# Patient Record
Sex: FEMALE | Race: WHITE | Employment: FULL TIME | URBAN - NONMETROPOLITAN AREA
[De-identification: names, ages, dates, MRNs, and addresses within clinical notes are randomized per-mention and may not be internally consistent; named-entity substitution may affect disease eponyms.]

---

## 2022-05-10 PROBLEM — G43.009 MIGRAINE WITHOUT AURA AND WITHOUT STATUS MIGRAINOSUS, NOT INTRACTABLE: Status: ACTIVE | Noted: 2022-05-10

## 2024-10-31 ENCOUNTER — INITIAL PRENATAL (OUTPATIENT)
Dept: OBGYN | Age: 24
End: 2024-10-31

## 2024-10-31 ENCOUNTER — ANCILLARY PROCEDURE (OUTPATIENT)
Dept: OBGYN | Age: 24
End: 2024-10-31
Payer: COMMERCIAL

## 2024-10-31 ENCOUNTER — HOSPITAL ENCOUNTER (OUTPATIENT)
Age: 24
Setting detail: SPECIMEN
Discharge: HOME | End: 2024-10-31
Payer: COMMERCIAL

## 2024-10-31 VITALS — SYSTOLIC BLOOD PRESSURE: 106 MMHG | WEIGHT: 129 LBS | BODY MASS INDEX: 22.14 KG/M2 | DIASTOLIC BLOOD PRESSURE: 68 MMHG

## 2024-10-31 DIAGNOSIS — Z32.01 POSITIVE PREGNANCY TEST: ICD-10-CM

## 2024-10-31 DIAGNOSIS — Z3A.08 8 WEEKS GESTATION OF PREGNANCY: ICD-10-CM

## 2024-10-31 DIAGNOSIS — Z34.01 ENCOUNTER FOR SUPERVISION OF NORMAL FIRST PREGNANCY IN FIRST TRIMESTER: Primary | ICD-10-CM

## 2024-10-31 DIAGNOSIS — Z34.01 ENCOUNTER FOR SUPERVISION OF NORMAL FIRST PREGNANCY IN FIRST TRIMESTER: ICD-10-CM

## 2024-10-31 DIAGNOSIS — N91.2 AMENORRHEA: ICD-10-CM

## 2024-10-31 DIAGNOSIS — O36.80X0 ENCOUNTER TO DETERMINE FETAL VIABILITY OF PREGNANCY, SINGLE OR UNSPECIFIED FETUS: ICD-10-CM

## 2024-10-31 LAB
ABO + RH BLD: NORMAL
BLOOD GROUP ANTIBODIES SERPL: NEGATIVE
HCV AB SERPL QL IA: NONREACTIVE

## 2024-10-31 PROCEDURE — 86762 RUBELLA ANTIBODY: CPT

## 2024-10-31 PROCEDURE — 86901 BLOOD TYPING SEROLOGIC RH(D): CPT

## 2024-10-31 PROCEDURE — 85025 COMPLETE CBC W/AUTO DIFF WBC: CPT

## 2024-10-31 PROCEDURE — 87591 N.GONORRHOEAE DNA AMP PROB: CPT

## 2024-10-31 PROCEDURE — 87086 URINE CULTURE/COLONY COUNT: CPT

## 2024-10-31 PROCEDURE — 86850 RBC ANTIBODY SCREEN: CPT

## 2024-10-31 PROCEDURE — 86803 HEPATITIS C AB TEST: CPT

## 2024-10-31 PROCEDURE — 86780 TREPONEMA PALLIDUM: CPT

## 2024-10-31 PROCEDURE — 87389 HIV-1 AG W/HIV-1&-2 AB AG IA: CPT

## 2024-10-31 PROCEDURE — 86900 BLOOD TYPING SEROLOGIC ABO: CPT

## 2024-10-31 PROCEDURE — 87340 HEPATITIS B SURFACE AG IA: CPT

## 2024-10-31 PROCEDURE — 87491 CHLMYD TRACH DNA AMP PROBE: CPT

## 2024-10-31 RX ORDER — ONDANSETRON 4 MG/1
4 TABLET, FILM COATED ORAL EVERY 8 HOURS PRN
COMMUNITY

## 2024-10-31 RX ORDER — FOLIC ACID 0.8 MG
800 TABLET ORAL DAILY
COMMUNITY

## 2024-10-31 RX ORDER — CLINDAMYCIN PHOSPHATE 10 MG/G
GEL TOPICAL 2 TIMES DAILY
COMMUNITY

## 2024-10-31 NOTE — PROGRESS NOTES
New OB Visit    Date of service: 10/31/2024    Fred Brown  Is a 23 y.o.  female presenting for a New OB visit with Nurse. Name of Father of Baby is Cresencio Brown  and is involved. 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    Pt is not Fertility pt.    PT's PCP is: Lorena Paredes, APRN - CNP     : 2000                                             Subjective:        No LMP recorded.   OB History   No obstetric history on file.           Social History     Tobacco Use   Smoking Status Never   Smokeless Tobacco Never        Social History     Substance and Sexual Activity   Alcohol Use None        Allergies: Patient has no known allergies.    Past Medical History:   Diagnosis Date    Migraines        No past surgical history on file.      Current Outpatient Medications:     pimecrolimus (ELIDEL) 1 % cream, Apply topically 2 times daily., Disp: 1 each, Rfl: 1    propranolol (INDERAL) 10 MG tablet, Take 1 tablet by mouth 2 times daily, Disp: 60 tablet, Rfl: 3    SUMAtriptan (IMITREX) 50 MG tablet, Take 1 tablet by mouth once as needed for Migraine May repeat x 1 in 2 hours PRN, Disp: 9 tablet, Rfl: 1    Calcium Carbonate (CALCIUM 500 PO), Take 1 tablet by mouth daily (Patient not taking: Reported on 3/16/2023), Disp: , Rfl:       Vital Signs not currently breastfeeding.      No results found for this visit on 10/31/24.      Pain: none                            Nausea: yes      Vomiting: yes        Breast enlargement or tenderness: yes    Frequency of urination:yes      Fatigue: yes         Patient history reviewed. Educational materials given and genetic testing reviewed.     OB Genetic Screening    Patient's Age 35+ at Date of Delivery      Cystic Fibrosis      Thalassemia MCV<80      [place] Chorea      Neural Tube Defect      Mental Retardation/Autism      Congenital Heart Defect      Was Person Treated for Fragilex?      Down Syndrome      Other Inherited Genetic Chromosomal Disorder?

## 2024-10-31 NOTE — PATIENT INSTRUCTIONS
pregnancy, then you'll probably be advised to gain 28 to 40 pounds. Your doctor will work with you to set a weight goal that is right for you. Gaining a healthy amount of weight helps you have a healthy baby.  Follow-up care is a key part of your treatment and safety. Be sure to make and go to all appointments, and call your doctor if you are having problems. It's also a good idea to know your test results and keep a list of the medicines you take.  How can you care for yourself at home?  Eat plenty of fruits and vegetables. Include a variety of orange, yellow, and leafy dark-green vegetables every day.  Choose whole-grain bread, cereal, and pasta. Good choices include whole wheat bread, whole wheat pasta, brown rice, and oatmeal.  Get 4 or more servings of milk and milk products each day. Good choices include nonfat or low-fat milk, yogurt, and cheese. If you cannot eat milk products, you can get calcium from calcium-fortified products such as orange juice, soy milk, and tofu. Other non-milk sources of calcium include leafy green vegetables, such as broccoli, kale, mustard greens, turnip greens, bok jake, and brussels sprouts.  If you eat meat, pick lower-fat types. Good choices include lean cuts of meat and chicken or turkey without the skin.  Avoid fish that are high in mercury. These include shark, swordfish, radha mackerel, marlin, orange roughy, and bigeye tuna, as well as tilefish from the [facility].  It's okay to eat up to 8 to 12 ounces a week of fish that are low in mercury or up to 4 ounces a week of fish that have medium levels of mercury. Some fish that are low in mercury are salmon, shrimp, canned light tuna, cod, and tilapia. Some fish that have medium levels of mercury are halibut and white albacore tuna.  For more advice about eating fish, you can visit the U.S. Food and Drug Administration (FDA) or U.S. Environmental Protection Agency (EPA) website.  Heat lunch meats (such as turkey, ham, or

## 2024-11-01 LAB
BASOPHILS # BLD: 0.04 K/UL (ref 0–0.2)
BASOPHILS NFR BLD: 0 % (ref 0–2)
CHLAMYDIA DNA UR QL NAA+PROBE: NEGATIVE
EOSINOPHIL # BLD: 0.04 K/UL (ref 0–0.44)
EOSINOPHILS RELATIVE PERCENT: 0 % (ref 1–4)
ERYTHROCYTE [DISTWIDTH] IN BLOOD BY AUTOMATED COUNT: 11.9 % (ref 11.8–14.4)
HBV SURFACE AG SERPL QL IA: NONREACTIVE
HCT VFR BLD AUTO: 36.1 % (ref 36.3–47.1)
HGB BLD-MCNC: 12.4 G/DL (ref 11.9–15.1)
HIV 1+2 AB+HIV1 P24 AG SERPL QL IA: NONREACTIVE
IMM GRANULOCYTES # BLD AUTO: 0.03 K/UL (ref 0–0.3)
IMM GRANULOCYTES NFR BLD: 0 %
LYMPHOCYTES NFR BLD: 2.35 K/UL (ref 1.1–3.7)
LYMPHOCYTES RELATIVE PERCENT: 22 % (ref 24–43)
MCH RBC QN AUTO: 30.5 PG (ref 25.2–33.5)
MCHC RBC AUTO-ENTMCNC: 34.3 G/DL (ref 28.4–34.8)
MCV RBC AUTO: 88.7 FL (ref 82.6–102.9)
MICROORGANISM SPEC CULT: NORMAL
MONOCYTES NFR BLD: 0.86 K/UL (ref 0.1–1.2)
MONOCYTES NFR BLD: 8 % (ref 3–12)
N GONORRHOEA DNA UR QL NAA+PROBE: NEGATIVE
NEUTROPHILS NFR BLD: 70 % (ref 36–65)
NEUTS SEG NFR BLD: 7.38 K/UL (ref 1.5–8.1)
NRBC BLD-RTO: 0 PER 100 WBC
PLATELET # BLD AUTO: 339 K/UL (ref 138–453)
PMV BLD AUTO: 10.5 FL (ref 8.1–13.5)
RBC # BLD AUTO: 4.07 M/UL (ref 3.95–5.11)
RUBV IGG SERPL QL IA: 36.4 IU/ML
SERVICE CMNT-IMP: NORMAL
SPECIMEN DESCRIPTION: NORMAL
SPECIMEN DESCRIPTION: NORMAL
T PALLIDUM AB SER QL IA: NONREACTIVE
WBC OTHER # BLD: 10.7 K/UL (ref 3.5–11.3)

## 2024-11-29 SDOH — ECONOMIC STABILITY: TRANSPORTATION INSECURITY
IN THE PAST 12 MONTHS, HAS LACK OF TRANSPORTATION KEPT YOU FROM MEETINGS, WORK, OR FROM GETTING THINGS NEEDED FOR DAILY LIVING?: NO

## 2024-11-29 SDOH — ECONOMIC STABILITY: FOOD INSECURITY: WITHIN THE PAST 12 MONTHS, YOU WORRIED THAT YOUR FOOD WOULD RUN OUT BEFORE YOU GOT MONEY TO BUY MORE.: NEVER TRUE

## 2024-11-29 SDOH — ECONOMIC STABILITY: FOOD INSECURITY: WITHIN THE PAST 12 MONTHS, THE FOOD YOU BOUGHT JUST DIDN'T LAST AND YOU DIDN'T HAVE MONEY TO GET MORE.: NEVER TRUE

## 2024-11-29 SDOH — ECONOMIC STABILITY: INCOME INSECURITY: HOW HARD IS IT FOR YOU TO PAY FOR THE VERY BASICS LIKE FOOD, HOUSING, MEDICAL CARE, AND HEATING?: NOT VERY HARD

## 2024-12-02 ENCOUNTER — ROUTINE PRENATAL (OUTPATIENT)
Dept: OBGYN | Age: 24
End: 2024-12-02

## 2024-12-02 ENCOUNTER — HOSPITAL ENCOUNTER (OUTPATIENT)
Age: 24
Setting detail: SPECIMEN
Discharge: HOME | End: 2024-12-02
Payer: COMMERCIAL

## 2024-12-02 VITALS
BODY MASS INDEX: 21.11 KG/M2 | WEIGHT: 123 LBS | HEART RATE: 102 BPM | DIASTOLIC BLOOD PRESSURE: 70 MMHG | SYSTOLIC BLOOD PRESSURE: 114 MMHG

## 2024-12-02 DIAGNOSIS — Z3A.12 12 WEEKS GESTATION OF PREGNANCY: ICD-10-CM

## 2024-12-02 DIAGNOSIS — Z34.01 ENCOUNTER FOR SUPERVISION OF NORMAL FIRST PREGNANCY IN FIRST TRIMESTER: Primary | ICD-10-CM

## 2024-12-02 DIAGNOSIS — Z12.4 SCREENING FOR CERVICAL CANCER: ICD-10-CM

## 2024-12-02 PROCEDURE — G0145 SCR C/V CYTO,THINLAYER,RESCR: HCPCS

## 2024-12-02 PROCEDURE — 0501F PRENATAL FLOW SHEET: CPT | Performed by: ADVANCED PRACTICE MIDWIFE

## 2024-12-02 NOTE — PROGRESS NOTES
Fred Brown is here at 12w4d for:    Chief Complaint   Patient presents with    Routine Prenatal Visit     TBE, patient has never had pap.        Estimated Due Date: Estimated Date of Delivery: 25    OB History    Para Term  AB Living   1             SAB IAB Ectopic Molar Multiple Live Births                    # Outcome Date GA Lbr Jerod/2nd Weight Sex Type Anes PTL Lv   1 Current                 Past Medical History:   Diagnosis Date    Migraine     Migraines        Past Surgical History:   Procedure Laterality Date    TONSILLECTOMY      WISDOM TOOTH EXTRACTION         Social History     Tobacco Use   Smoking Status Never   Smokeless Tobacco Never        Social History     Substance and Sexual Activity   Alcohol Use Not Currently               HPI: here for initial ob exam, denies c/o     PT denies fever, chills, nausea and vomiting       Vitals:  Estimated body mass index is 21.11 kg/m² as calculated from the following:    Height as of 22: 1.626 m (5' 4\").    Weight as of this encounter: 55.8 kg (123 lb).  BP: 114/70  Weight - Scale: 55.8 kg (123 lb)  Pulse: (!) 102  Patient Position: Sitting  Albumin: Negative  Glucose: Negative  Fundal Height (cm): 12 cm  Fetal HR: 155  Movement: Absent           Abdomen: flat.soft, nontender  Total body exam completed please see prenatal physical exam tab in visit navigator       Results reviewed today:    No results found for this visit on 24.        ASSESSMENT & Plan    Diagnosis Orders   1. Encounter for supervision of normal first pregnancy in first trimester        2. 12 weeks gestation of pregnancy        3. Screening for cervical cancer  PAP SMEAR                I am having Fred Brown maintain her Calcium Carbonate (CALCIUM 500 PO), SUMAtriptan, propranolol, pimecrolimus, Prenatal Vit-Fe Fumarate-FA (PRENATAL 19 PO), folic acid, ondansetron, and clindamycin.    Return in about 4 weeks (around 2024) for ob.    There are

## 2024-12-06 LAB — CYTOLOGY REPORT: NORMAL

## 2024-12-07 LAB
Lab: NORMAL
NTRA FETAL FRACTION: NORMAL
NTRA GENDER OF FETUS: NORMAL
NTRA MONOSOMY X AGE-BASED RISK TEXT: NORMAL
NTRA MONOSOMY X RESULT TEXT: NORMAL
NTRA MONOSOMY X RISK SCORE TEXT: NORMAL
NTRA TRIPLOIDY RESULT TEXT: NORMAL
NTRA TRISOMY 13 AGE-BASED RISK TEXT: NORMAL
NTRA TRISOMY 13 RESULT TEXT: NORMAL
NTRA TRISOMY 13 RISK SCORE TEXT: NORMAL
NTRA TRISOMY 18 AGE-BASED RISK TEXT: NORMAL
NTRA TRISOMY 18 RESULT TEXT: NORMAL
NTRA TRISOMY 18 RISK SCORE TEXT: NORMAL
NTRA TRISOMY 21 AGE-BASED RISK TEXT: NORMAL
NTRA TRISOMY 21 RESULT TEXT: NORMAL
NTRA TRISOMY 21 RISK SCORE TEXT: NORMAL

## 2024-12-11 LAB
Lab: NEGATIVE
Lab: NORMAL
NTRA CYSTIC FIBROSIS: NEGATIVE
NTRA FRAGILE X SYNDROME: NEGATIVE
NTRA SPINAL MUSCULAR ATROPHY: NEGATIVE

## 2024-12-30 ENCOUNTER — ROUTINE PRENATAL (OUTPATIENT)
Dept: OBGYN | Age: 24
End: 2024-12-30
Payer: COMMERCIAL

## 2024-12-30 ENCOUNTER — HOSPITAL ENCOUNTER (OUTPATIENT)
Age: 24
Setting detail: SPECIMEN
Discharge: HOME | End: 2024-12-30
Payer: COMMERCIAL

## 2024-12-30 VITALS
DIASTOLIC BLOOD PRESSURE: 60 MMHG | WEIGHT: 127.8 LBS | HEART RATE: 87 BPM | BODY MASS INDEX: 21.94 KG/M2 | SYSTOLIC BLOOD PRESSURE: 116 MMHG

## 2024-12-30 DIAGNOSIS — Z3A.16 16 WEEKS GESTATION OF PREGNANCY: ICD-10-CM

## 2024-12-30 DIAGNOSIS — Z3A.16 16 WEEKS GESTATION OF PREGNANCY: Primary | ICD-10-CM

## 2024-12-30 DIAGNOSIS — Z34.02 ENCOUNTER FOR SUPERVISION OF NORMAL FIRST PREGNANCY IN SECOND TRIMESTER: ICD-10-CM

## 2024-12-30 PROCEDURE — 82105 ALPHA-FETOPROTEIN SERUM: CPT

## 2024-12-30 PROCEDURE — 36415 COLL VENOUS BLD VENIPUNCTURE: CPT | Performed by: ADVANCED PRACTICE MIDWIFE

## 2024-12-30 PROCEDURE — 99213 OFFICE O/P EST LOW 20 MIN: CPT | Performed by: ADVANCED PRACTICE MIDWIFE

## 2024-12-30 PROCEDURE — PBSHW ALPHA FETOPROTEIN, MATERNAL: Performed by: ADVANCED PRACTICE MIDWIFE

## 2024-12-30 NOTE — PROGRESS NOTES
Fred Brown is here at 16w4d for:    Chief Complaint   Patient presents with    Routine Prenatal Visit       Estimated Due Date: Estimated Date of Delivery: 25    OB History    Para Term  AB Living   1             SAB IAB Ectopic Molar Multiple Live Births                    # Outcome Date GA Lbr Jerod/2nd Weight Sex Type Anes PTL Lv   1 Current                 Past Medical History:   Diagnosis Date    Migraine     Migraines        Past Surgical History:   Procedure Laterality Date    TONSILLECTOMY      WISDOM TOOTH EXTRACTION         Social History     Tobacco Use   Smoking Status Never   Smokeless Tobacco Never        Social History     Substance and Sexual Activity   Alcohol Use Not Currently               HPI: here for routine ob visit and low risk panorama f/u today with AFP     PT denies fever, chills, nausea and vomiting       Vitals:  Estimated body mass index is 21.94 kg/m² as calculated from the following:    Height as of 22: 1.626 m (5' 4\").    Weight as of this encounter: 58 kg (127 lb 12.8 oz).  BP: 116/60  Weight - Scale: 58 kg (127 lb 12.8 oz)  Pulse: 87  Patient Position: Sitting  Albumin: Negative  Glucose: Negative  Fundal Height (cm): 16 cm  Fetal HR: 146  Movement: Present           Abdomen: flat, soft, nontender    Results reviewed today:    No results found for this visit on 24.        ASSESSMENT & Plan    Diagnosis Orders   1. 16 weeks gestation of pregnancy  Alpha Fetoprotein, Maternal      2. Encounter for supervision of normal first pregnancy in second trimester                  I am having Fred Brown maintain her Prenatal Vit-Fe Fumarate-FA (PRENATAL 19 PO), folic acid, ondansetron, and clindamycin.    Return in about 4 weeks (around 2025) for ob 20wkus.    There are no Patient Instructions on file for this visit.             SARAH Castellanos CNM,2024 1:15 PM

## 2025-01-01 LAB
AFP INTERPRETATION: NORMAL
AFP MOM: 0.94
AFP SPECIMEN: NORMAL
AFP: 39 NG/ML
DATE OF BIRTH: NORMAL
DATING METHOD: NORMAL
DETERMINED BY: NORMAL
DIABETIC: NORMAL
DONOR EGG?: NORMAL
DUE DATE: NORMAL
ESTIMATED DUE DATE: NORMAL
FAMILY HISTORY NTD: NORMAL
GESTATIONAL AGE: NORMAL
IN VITRO FERTILIZATION: NORMAL
INSULIN REQ DIABETES: NO
LAST MENSTRUAL PERIOD: NORMAL
MATERNAL AGE AT EDD: 23.5 YR
MATERNAL WEIGHT: NORMAL
MONOCHORIONIC TWINS: NORMAL
NUMBER OF FETUSES: NORMAL
PATIENT WEIGHT UNITS: NORMAL
PATIENT WEIGHT: NORMAL
RACE (MATERNAL): NORMAL
RACE: NORMAL
REPEAT SPECIMEN?: NORMAL
SMOKING: NORMAL
SMOKING: NORMAL
VALPROIC/CARBAMAZEP: NORMAL
ZZ NTE CLEAN UP: HISTORY: NORMAL

## 2025-01-08 ENCOUNTER — PATIENT MESSAGE (OUTPATIENT)
Dept: OBGYN | Age: 25
End: 2025-01-08

## 2025-01-08 NOTE — TELEPHONE ENCOUNTER
She can try otc hydrocortisone cream very thin layer BID for 1-2 weeks at a time.  Care to not get in eyes.

## 2025-01-25 SDOH — ECONOMIC STABILITY: INCOME INSECURITY: IN THE LAST 12 MONTHS, WAS THERE A TIME WHEN YOU WERE NOT ABLE TO PAY THE MORTGAGE OR RENT ON TIME?: NO

## 2025-01-25 SDOH — ECONOMIC STABILITY: FOOD INSECURITY: WITHIN THE PAST 12 MONTHS, THE FOOD YOU BOUGHT JUST DIDN'T LAST AND YOU DIDN'T HAVE MONEY TO GET MORE.: NEVER TRUE

## 2025-01-25 SDOH — ECONOMIC STABILITY: FOOD INSECURITY: WITHIN THE PAST 12 MONTHS, YOU WORRIED THAT YOUR FOOD WOULD RUN OUT BEFORE YOU GOT MONEY TO BUY MORE.: NEVER TRUE

## 2025-01-25 SDOH — ECONOMIC STABILITY: TRANSPORTATION INSECURITY
IN THE PAST 12 MONTHS, HAS THE LACK OF TRANSPORTATION KEPT YOU FROM MEDICAL APPOINTMENTS OR FROM GETTING MEDICATIONS?: NO

## 2025-01-28 ENCOUNTER — ROUTINE PRENATAL (OUTPATIENT)
Dept: OBGYN | Age: 25
End: 2025-01-28

## 2025-01-28 VITALS
WEIGHT: 132 LBS | DIASTOLIC BLOOD PRESSURE: 72 MMHG | BODY MASS INDEX: 22.66 KG/M2 | SYSTOLIC BLOOD PRESSURE: 116 MMHG | HEART RATE: 88 BPM

## 2025-01-28 DIAGNOSIS — Z34.02 ENCOUNTER FOR SUPERVISION OF NORMAL FIRST PREGNANCY IN SECOND TRIMESTER: Primary | ICD-10-CM

## 2025-01-28 DIAGNOSIS — Z3A.20 20 WEEKS GESTATION OF PREGNANCY: ICD-10-CM

## 2025-01-28 PROCEDURE — 0502F SUBSEQUENT PRENATAL CARE: CPT | Performed by: ADVANCED PRACTICE MIDWIFE

## 2025-01-28 ASSESSMENT — PATIENT HEALTH QUESTIONNAIRE - PHQ9
1. LITTLE INTEREST OR PLEASURE IN DOING THINGS: NOT AT ALL
SUM OF ALL RESPONSES TO PHQ QUESTIONS 1-9: 0
2. FEELING DOWN, DEPRESSED OR HOPELESS: NOT AT ALL
SUM OF ALL RESPONSES TO PHQ QUESTIONS 1-9: 0
SUM OF ALL RESPONSES TO PHQ9 QUESTIONS 1 & 2: 0

## 2025-01-28 NOTE — PROGRESS NOTES
Fred Brown is here at 20w5d for:    Chief Complaint   Patient presents with    Annual Exam     Follow up 20 wk U/S. Pt states she would like a refill on her zofran.        Estimated Due Date: Estimated Date of Delivery: 25    OB History    Para Term  AB Living   1             SAB IAB Ectopic Molar Multiple Live Births                    # Outcome Date GA Lbr Jerod/2nd Weight Sex Type Anes PTL Lv   1 Current                 Past Medical History:   Diagnosis Date    Migraine     Migraines        Past Surgical History:   Procedure Laterality Date    TONSILLECTOMY      WISDOM TOOTH EXTRACTION         Social History     Tobacco Use   Smoking Status Never   Smokeless Tobacco Never        Social History     Substance and Sexual Activity   Alcohol Use Not Currently               HPI: here for routine ob visit and anatomy scan WNL     PT denies fever, chills, nausea and vomiting       Vitals:  Estimated body mass index is 22.66 kg/m² as calculated from the following:    Height as of 22: 1.626 m (5' 4\").    Weight as of this encounter: 59.9 kg (132 lb).  BP: 116/72  Weight - Scale: 59.9 kg (132 lb)  Pulse: 88  Patient Position: Sitting  Albumin: Negative  Glucose: Negative  Fundal Height (cm): 20 cm  Fetal HR: 145  Movement: Present  Presentation: Breech           Abdomen: gravid,soft, nontender    Results reviewed today:  21.6 WK IUP  CL:3.3 cm  HR:144 bpm  Posterior placenta, breech presentation  Ovaries: both seen, wnl.  Gender: male  Active fetal movements  All visualized fetal anatomy WNL    No results found for this visit on 25.        ASSESSMENT & Plan    Diagnosis Orders   1. Encounter for supervision of normal first pregnancy in second trimester        2. 20 weeks gestation of pregnancy                  I am having Fred Brown maintain her Prenatal Vit-Fe Fumarate-FA (PRENATAL 19 PO), folic acid, ondansetron, and clindamycin.    Return in about 4 weeks (around 2025)

## 2025-02-04 RX ORDER — ONDANSETRON 4 MG/1
4 TABLET, FILM COATED ORAL EVERY 8 HOURS PRN
Qty: 30 TABLET | Refills: 2 | Status: SHIPPED | OUTPATIENT
Start: 2025-02-04

## 2025-02-04 NOTE — TELEPHONE ENCOUNTER
Pt had called in and lvm inquiring about zofran refill, pt is 21 weeks gestation. Next apt is 2/24/25

## 2025-02-21 SDOH — ECONOMIC STABILITY: TRANSPORTATION INSECURITY
IN THE PAST 12 MONTHS, HAS THE LACK OF TRANSPORTATION KEPT YOU FROM MEDICAL APPOINTMENTS OR FROM GETTING MEDICATIONS?: YES

## 2025-02-21 SDOH — ECONOMIC STABILITY: TRANSPORTATION INSECURITY
IN THE PAST 12 MONTHS, HAS LACK OF TRANSPORTATION KEPT YOU FROM MEETINGS, WORK, OR FROM GETTING THINGS NEEDED FOR DAILY LIVING?: YES

## 2025-02-21 SDOH — ECONOMIC STABILITY: FOOD INSECURITY: WITHIN THE PAST 12 MONTHS, THE FOOD YOU BOUGHT JUST DIDN'T LAST AND YOU DIDN'T HAVE MONEY TO GET MORE.: NEVER TRUE

## 2025-02-21 SDOH — ECONOMIC STABILITY: INCOME INSECURITY: IN THE LAST 12 MONTHS, WAS THERE A TIME WHEN YOU WERE NOT ABLE TO PAY THE MORTGAGE OR RENT ON TIME?: YES

## 2025-02-21 SDOH — ECONOMIC STABILITY: FOOD INSECURITY: WITHIN THE PAST 12 MONTHS, YOU WORRIED THAT YOUR FOOD WOULD RUN OUT BEFORE YOU GOT MONEY TO BUY MORE.: NEVER TRUE

## 2025-02-24 ENCOUNTER — ROUTINE PRENATAL (OUTPATIENT)
Dept: OBGYN | Age: 25
End: 2025-02-24

## 2025-02-24 VITALS
WEIGHT: 136 LBS | HEART RATE: 86 BPM | SYSTOLIC BLOOD PRESSURE: 116 MMHG | BODY MASS INDEX: 23.34 KG/M2 | DIASTOLIC BLOOD PRESSURE: 66 MMHG

## 2025-02-24 DIAGNOSIS — Z34.02 ENCOUNTER FOR SUPERVISION OF NORMAL FIRST PREGNANCY IN SECOND TRIMESTER: ICD-10-CM

## 2025-02-24 DIAGNOSIS — Z3A.24 24 WEEKS GESTATION OF PREGNANCY: Primary | ICD-10-CM

## 2025-02-24 PROCEDURE — 0502F SUBSEQUENT PRENATAL CARE: CPT | Performed by: ADVANCED PRACTICE MIDWIFE

## 2025-02-24 NOTE — PROGRESS NOTES
Fred Brown is here at 24w4d for:    Chief Complaint   Patient presents with    Routine Prenatal Visit     Instructions for 1 hour gtt. At 28 weeks.        Estimated Due Date: Estimated Date of Delivery: 25    OB History    Para Term  AB Living   1             SAB IAB Ectopic Molar Multiple Live Births                    # Outcome Date GA Lbr Jerod/2nd Weight Sex Type Anes PTL Lv   1 Current                 Past Medical History:   Diagnosis Date    Migraine     Migraines        Past Surgical History:   Procedure Laterality Date    TONSILLECTOMY      WISDOM TOOTH EXTRACTION         Social History     Tobacco Use   Smoking Status Never   Smokeless Tobacco Never        Social History     Substance and Sexual Activity   Alcohol Use Not Currently               HPI: here for routine ob visit, denies c/o     PT denies fever, chills, nausea and vomiting       Vitals:  Estimated body mass index is 23.34 kg/m² as calculated from the following:    Height as of 22: 1.626 m (5' 4\").    Weight as of this encounter: 61.7 kg (136 lb).  BP: 116/66  Weight - Scale: 61.7 kg (136 lb)  Pulse: 86  Patient Position: Sitting  Albumin: Negative  Glucose: Negative  Fundal Height (cm): 25 cm  Fetal HR: 140  Movement: Present           Abdomen: gravid,soft, nontender    Results reviewed today:    No results found for this visit on 25.        ASSESSMENT & Plan   Assessment & Plan  24 weeks gestation of pregnancy   Chronic, at goal (stable), continue current treatment plan         Encounter for supervision of normal first pregnancy in second trimester   Chronic, at goal (stable), continue current treatment plan                 I am having Fred Brown maintain her Prenatal Vit-Fe Fumarate-FA (PRENATAL 19 PO), folic acid, and ondansetron.    Return in about 4 weeks (around 3/24/2025) for ob gtt.    There are no Patient Instructions on file for this visit.             SARAH Castellanos -

## 2025-03-23 SDOH — ECONOMIC STABILITY: FOOD INSECURITY: WITHIN THE PAST 12 MONTHS, YOU WORRIED THAT YOUR FOOD WOULD RUN OUT BEFORE YOU GOT MONEY TO BUY MORE.: NEVER TRUE

## 2025-03-23 SDOH — ECONOMIC STABILITY: INCOME INSECURITY: IN THE LAST 12 MONTHS, WAS THERE A TIME WHEN YOU WERE NOT ABLE TO PAY THE MORTGAGE OR RENT ON TIME?: NO

## 2025-03-23 SDOH — ECONOMIC STABILITY: FOOD INSECURITY: WITHIN THE PAST 12 MONTHS, THE FOOD YOU BOUGHT JUST DIDN'T LAST AND YOU DIDN'T HAVE MONEY TO GET MORE.: NEVER TRUE

## 2025-03-24 ENCOUNTER — ROUTINE PRENATAL (OUTPATIENT)
Dept: OBGYN | Age: 25
End: 2025-03-24

## 2025-03-24 VITALS
DIASTOLIC BLOOD PRESSURE: 68 MMHG | SYSTOLIC BLOOD PRESSURE: 116 MMHG | WEIGHT: 142.8 LBS | HEART RATE: 116 BPM | BODY MASS INDEX: 24.51 KG/M2

## 2025-03-24 DIAGNOSIS — Z34.03 ENCOUNTER FOR SUPERVISION OF NORMAL FIRST PREGNANCY IN THIRD TRIMESTER: ICD-10-CM

## 2025-03-24 DIAGNOSIS — Z3A.28 28 WEEKS GESTATION OF PREGNANCY: Primary | ICD-10-CM

## 2025-03-24 LAB
GLUCOSE 60 MIN, POC: 107
HGB, POC: 11.4 G/DL

## 2025-03-24 PROCEDURE — 0502F SUBSEQUENT PRENATAL CARE: CPT | Performed by: ADVANCED PRACTICE MIDWIFE

## 2025-03-24 NOTE — PROGRESS NOTES
Fred Brown is here at 28w4d for:    Chief Complaint   Patient presents with    Routine Prenatal Visit     1 hour gtt.        Estimated Due Date: Estimated Date of Delivery: 25    OB History    Para Term  AB Living   1        SAB IAB Ectopic Molar Multiple Live Births              # Outcome Date GA Lbr Jerod/2nd Weight Sex Type Anes PTL Lv   1 Current                 Past Medical History:   Diagnosis Date    Migraine     Migraines        Past Surgical History:   Procedure Laterality Date    TONSILLECTOMY      WISDOM TOOTH EXTRACTION         Social History     Tobacco Use   Smoking Status Never   Smokeless Tobacco Never        Social History     Substance and Sexual Activity   Alcohol Use Not Currently               HPI: here for routine ob visit, denies c/o     PT denies fever, chills, nausea and vomiting       Vitals:  Estimated body mass index is 24.51 kg/m² as calculated from the following:    Height as of 22: 1.626 m (5' 4\").    Weight as of this encounter: 64.8 kg (142 lb 12.8 oz).  BP: 116/68  Weight - Scale: 64.8 kg (142 lb 12.8 oz)  Pulse: (!) 116  Patient Position: Sitting  Albumin: Negative  Glucose: Negative  Fundal Height (cm): 28 cm  Fetal HR: 156  Movement: Present           Abdomen: gravid,soft, nontender    Results reviewed today:    No results found for this visit on 25.        ASSESSMENT & Plan   Assessment & Plan  28 weeks gestation of pregnancy   Chronic, at goal (stable), continue current treatment plan    Orders:    POCT hemoglobin    POCT Glucose Tolerance 1 Hr    Encounter for supervision of normal first pregnancy in third trimester   Chronic, at goal (stable), continue current treatment plan                 I am having Fred Brown maintain her Prenatal Vit-Fe Fumarate-FA (PRENATAL 19 PO), folic acid, and ondansetron.    Return in about 2 weeks (around 2025) for ob 30wkUS+tdap.    There are no Patient Instructions on file for this visit.

## 2025-04-07 ENCOUNTER — ROUTINE PRENATAL (OUTPATIENT)
Dept: OBGYN | Age: 25
End: 2025-04-07
Payer: COMMERCIAL

## 2025-04-07 VITALS
BODY MASS INDEX: 24.85 KG/M2 | SYSTOLIC BLOOD PRESSURE: 116 MMHG | WEIGHT: 144.8 LBS | HEART RATE: 107 BPM | DIASTOLIC BLOOD PRESSURE: 70 MMHG

## 2025-04-07 DIAGNOSIS — Z23 NEED FOR TDAP VACCINATION: Primary | ICD-10-CM

## 2025-04-07 DIAGNOSIS — Z34.03 ENCOUNTER FOR SUPERVISION OF NORMAL FIRST PREGNANCY IN THIRD TRIMESTER: ICD-10-CM

## 2025-04-07 DIAGNOSIS — Z3A.30 30 WEEKS GESTATION OF PREGNANCY: ICD-10-CM

## 2025-04-07 PROCEDURE — 0502F SUBSEQUENT PRENATAL CARE: CPT | Performed by: ADVANCED PRACTICE MIDWIFE

## 2025-04-07 PROCEDURE — 90715 TDAP VACCINE 7 YRS/> IM: CPT | Performed by: ADVANCED PRACTICE MIDWIFE

## 2025-04-07 PROCEDURE — 90471 IMMUNIZATION ADMIN: CPT | Performed by: ADVANCED PRACTICE MIDWIFE

## 2025-04-21 ENCOUNTER — ROUTINE PRENATAL (OUTPATIENT)
Dept: OBGYN | Age: 25
End: 2025-04-21

## 2025-04-21 ENCOUNTER — HOSPITAL ENCOUNTER (OUTPATIENT)
Age: 25
Discharge: HOME | End: 2025-04-21
Payer: COMMERCIAL

## 2025-04-21 ENCOUNTER — RESULTS FOLLOW-UP (OUTPATIENT)
Dept: OBGYN | Age: 25
End: 2025-04-21

## 2025-04-21 VITALS
WEIGHT: 146 LBS | SYSTOLIC BLOOD PRESSURE: 116 MMHG | HEART RATE: 112 BPM | DIASTOLIC BLOOD PRESSURE: 66 MMHG | BODY MASS INDEX: 25.06 KG/M2

## 2025-04-21 DIAGNOSIS — Z34.03 ENCOUNTER FOR SUPERVISION OF NORMAL FIRST PREGNANCY IN THIRD TRIMESTER: ICD-10-CM

## 2025-04-21 DIAGNOSIS — R00.2 PALPITATIONS: ICD-10-CM

## 2025-04-21 DIAGNOSIS — R00.2 PALPITATIONS: Primary | ICD-10-CM

## 2025-04-21 DIAGNOSIS — Z3A.32 32 WEEKS GESTATION OF PREGNANCY: ICD-10-CM

## 2025-04-21 LAB
ALBUMIN SERPL-MCNC: 3.4 G/DL (ref 3.5–5.2)
ALBUMIN/GLOB SERPL: 1.1 {RATIO} (ref 1–2.5)
ALP SERPL-CCNC: 129 U/L (ref 35–104)
ALT SERPL-CCNC: 11 U/L (ref 10–35)
ANION GAP SERPL CALCULATED.3IONS-SCNC: 10 MMOL/L (ref 9–16)
AST SERPL-CCNC: 17 U/L (ref 10–35)
BASOPHILS # BLD: 0.03 K/UL (ref 0–0.2)
BASOPHILS NFR BLD: 0 % (ref 0–2)
BILIRUB SERPL-MCNC: <0.2 MG/DL (ref 0–1.2)
BUN SERPL-MCNC: 6 MG/DL (ref 6–20)
BUN/CREAT SERPL: 12 (ref 9–20)
CALCIUM SERPL-MCNC: 8.5 MG/DL (ref 8.6–10.4)
CHLORIDE SERPL-SCNC: 103 MMOL/L (ref 98–107)
CO2 SERPL-SCNC: 24 MMOL/L (ref 20–31)
CREAT SERPL-MCNC: 0.5 MG/DL (ref 0.5–0.9)
EOSINOPHIL # BLD: 0.06 K/UL (ref 0–0.44)
EOSINOPHILS RELATIVE PERCENT: 1 % (ref 1–4)
ERYTHROCYTE [DISTWIDTH] IN BLOOD BY AUTOMATED COUNT: 11.8 % (ref 11.8–14.4)
GFR, ESTIMATED: >90 ML/MIN/1.73M2
GLUCOSE SERPL-MCNC: 82 MG/DL (ref 74–99)
HCT VFR BLD AUTO: 33.6 % (ref 36.3–47.1)
HGB BLD-MCNC: 11.1 G/DL (ref 11.9–15.1)
IMM GRANULOCYTES # BLD AUTO: 0.05 K/UL (ref 0–0.3)
IMM GRANULOCYTES NFR BLD: 1 %
LYMPHOCYTES NFR BLD: 1.72 K/UL (ref 1.1–3.7)
LYMPHOCYTES RELATIVE PERCENT: 19 % (ref 24–43)
MCH RBC QN AUTO: 28.6 PG (ref 25.2–33.5)
MCHC RBC AUTO-ENTMCNC: 33 G/DL (ref 28.4–34.8)
MCV RBC AUTO: 86.6 FL (ref 82.6–102.9)
MONOCYTES NFR BLD: 0.82 K/UL (ref 0.1–1.2)
MONOCYTES NFR BLD: 9 % (ref 3–12)
NEUTROPHILS NFR BLD: 70 % (ref 36–65)
NEUTS SEG NFR BLD: 6.48 K/UL (ref 1.5–8.1)
NRBC BLD-RTO: 0 PER 100 WBC
PLATELET # BLD AUTO: 270 K/UL (ref 138–453)
PMV BLD AUTO: 9.5 FL (ref 8.1–13.5)
POTASSIUM SERPL-SCNC: 4.2 MMOL/L (ref 3.7–5.3)
PROT SERPL-MCNC: 6.4 G/DL (ref 6.6–8.7)
RBC # BLD AUTO: 3.88 M/UL (ref 3.95–5.11)
SODIUM SERPL-SCNC: 137 MMOL/L (ref 136–145)
WBC OTHER # BLD: 9.2 K/UL (ref 3.5–11.3)

## 2025-04-21 PROCEDURE — 36415 COLL VENOUS BLD VENIPUNCTURE: CPT

## 2025-04-21 PROCEDURE — 85025 COMPLETE CBC W/AUTO DIFF WBC: CPT

## 2025-04-21 PROCEDURE — 80053 COMPREHEN METABOLIC PANEL: CPT

## 2025-04-21 PROCEDURE — 0502F SUBSEQUENT PRENATAL CARE: CPT | Performed by: ADVANCED PRACTICE MIDWIFE

## 2025-04-21 SDOH — ECONOMIC STABILITY: FOOD INSECURITY: WITHIN THE PAST 12 MONTHS, THE FOOD YOU BOUGHT JUST DIDN'T LAST AND YOU DIDN'T HAVE MONEY TO GET MORE.: NEVER TRUE

## 2025-04-21 SDOH — ECONOMIC STABILITY: FOOD INSECURITY: WITHIN THE PAST 12 MONTHS, YOU WORRIED THAT YOUR FOOD WOULD RUN OUT BEFORE YOU GOT MONEY TO BUY MORE.: NEVER TRUE

## 2025-04-21 NOTE — PROGRESS NOTES
Fred Brown is here at 32w4d for:    Chief Complaint   Patient presents with    Routine Prenatal Visit     32 wk OB. Pt states she is still having heart palpations almost everyday.        Estimated Due Date: Estimated Date of Delivery: 25    OB History    Para Term  AB Living   1        SAB IAB Ectopic Molar Multiple Live Births              # Outcome Date GA Lbr Jerod/2nd Weight Sex Type Anes PTL Lv   1 Current                 Past Medical History:   Diagnosis Date    Migraine     Migraines        Past Surgical History:   Procedure Laterality Date    TONSILLECTOMY      WISDOM TOOTH EXTRACTION         Social History     Tobacco Use   Smoking Status Never   Smokeless Tobacco Never        Social History     Substance and Sexual Activity   Alcohol Use Not Currently               HPI: here for routine ob visit, c/o heart racing, is a MA for [employer]     PT denies fever, chills, nausea and vomiting       Vitals:  Estimated body mass index is 25.06 kg/m² as calculated from the following:    Height as of 22: 1.626 m (5' 4\").    Weight as of this encounter: 66.2 kg (146 lb).  BP: 116/66  Weight - Scale: 66.2 kg (146 lb)  Pulse: (!) 112  Patient Position: Sitting  Albumin: Negative  Glucose: Negative  Fundal Height (cm): 32 cm  Fetal HR: 148  Movement: Present           Abdomen: gravid,soft, nontender    Results reviewed today:    No results found for this visit on 25.        ASSESSMENT & Plan   Assessment & Plan  Palpitations   New, not at goal (unstable), continue current plan pending work up below    Orders:    Cardiac holter monitor (1 day-2 day); Future    CBC with Auto Differential; Future    Comprehensive Metabolic Panel; Future    32 weeks gestation of pregnancy   Chronic, at goal (stable), continue current treatment plan         Encounter for supervision of normal first pregnancy in third trimester   Chronic, at goal (stable), continue current treatment plan                 I am

## 2025-04-22 NOTE — RESULT ENCOUNTER NOTE
Pt. notified of results and voices understanding. Patient will receive holter monitor on 04/24/2025.

## 2025-04-24 ENCOUNTER — HOSPITAL ENCOUNTER (OUTPATIENT)
Age: 25
Discharge: HOME | End: 2025-04-26
Attending: ADVANCED PRACTICE MIDWIFE
Payer: COMMERCIAL

## 2025-04-24 DIAGNOSIS — R00.2 PALPITATIONS: ICD-10-CM

## 2025-04-24 PROCEDURE — 93225 XTRNL ECG REC<48 HRS REC: CPT

## 2025-05-04 LAB
ACQUISITION DURATION: NORMAL S
AVERAGE HEART RATE: 90 BPM
EKG DIAGNOSIS: NORMAL
HOLTER MAX HEART RATE: 133 BPM
HOOKUP DATE: NORMAL
HOOKUP TIME: NORMAL
MAX HEART RATE TIME/DATE: NORMAL
MIN HEART RATE TIME/DATE: NORMAL
MIN HEART RATE: 59 BPM
NUMBER OF QRS COMPLEXES: NORMAL
NUMBER OF SUPRAVENTRICULAR COUPLETS: 0
NUMBER OF SUPRAVENTRICULAR ECTOPICS: 0
NUMBER OF SUPRAVENTRICULAR ISOLATED BEATS: 0
NUMBER OF VENTRICULAR BIGEMINAL CYCLES: 0
NUMBER OF VENTRICULAR COUPLETS: 0
NUMBER OF VENTRICULAR ECTOPICS: 0

## 2025-05-05 ENCOUNTER — RESULTS FOLLOW-UP (OUTPATIENT)
Dept: OBGYN | Age: 25
End: 2025-05-05

## 2025-05-05 NOTE — RESULT ENCOUNTER NOTE
Pt. notified of results and voices understanding. Madhavi can you please send referral to cardiology for evaluation of palpitations. Thanks.

## 2025-05-06 ENCOUNTER — ROUTINE PRENATAL (OUTPATIENT)
Dept: OBGYN | Age: 25
End: 2025-05-06

## 2025-05-06 VITALS
DIASTOLIC BLOOD PRESSURE: 76 MMHG | WEIGHT: 149 LBS | SYSTOLIC BLOOD PRESSURE: 114 MMHG | BODY MASS INDEX: 25.58 KG/M2 | HEART RATE: 91 BPM

## 2025-05-06 DIAGNOSIS — R00.2 PALPITATIONS: ICD-10-CM

## 2025-05-06 DIAGNOSIS — Z3A.34 34 WEEKS GESTATION OF PREGNANCY: Primary | ICD-10-CM

## 2025-05-06 DIAGNOSIS — Z34.03 ENCOUNTER FOR SUPERVISION OF NORMAL FIRST PREGNANCY IN THIRD TRIMESTER: ICD-10-CM

## 2025-05-06 PROCEDURE — 0502F SUBSEQUENT PRENATAL CARE: CPT | Performed by: ADVANCED PRACTICE MIDWIFE

## 2025-05-06 NOTE — PROGRESS NOTES
Fred Brown is here at 34w5d for:    Chief Complaint   Patient presents with    Routine Prenatal Visit     Feeling ok. Patient scheduled to see cardiologist 09/15/2025.        Estimated Due Date: Estimated Date of Delivery: 25    OB History    Para Term  AB Living   1        SAB IAB Ectopic Molar Multiple Live Births              # Outcome Date GA Lbr Jerod/2nd Weight Sex Type Anes PTL Lv   1 Current                 Past Medical History:   Diagnosis Date    Migraine     Migraines        Past Surgical History:   Procedure Laterality Date    TONSILLECTOMY      WISDOM TOOTH EXTRACTION         Social History     Tobacco Use   Smoking Status Never   Smokeless Tobacco Never        Social History     Substance and Sexual Activity   Alcohol Use Not Currently               HPI: here for routine ob visit, see intermittant c/o palpitations and tachycardia    Had holter monitor for palpitations and high heart rate:  \"The rhythm was sinus.   Tachycardia 34% test duration.   Diary returned with entries \"palpitations\", \"chest pain\", \"short of breath/tachycardia\", and \"left side chest pain\". All of these were associated with normal sinus rhythm between 80-90 bpm.      Sinus tachycardia was seen during 34% of the test duration. Consider the diagnosis of postural orthostatic tachycardia syndrome (POTS) and tilt table testing or inappropriate sinus tachycardia if clinically indicated.\"         PT denies fever, chills, nausea and vomiting       Vitals:  Estimated body mass index is 25.58 kg/m² as calculated from the following:    Height as of 22: 1.626 m (5' 4\").    Weight as of this encounter: 67.6 kg (149 lb).  BP: 114/76  Weight - Scale: 67.6 kg (149 lb)  Pulse: 91  Patient Position: Sitting  Albumin: Negative  Glucose: Negative  Fundal Height (cm): 33 cm  Fetal HR: 155  Movement: Present           Abdomen: gravid,soft, nontender    Results reviewed today:    No results found for this visit on

## 2025-05-15 DIAGNOSIS — R00.2 PALPITATIONS: Primary | ICD-10-CM

## 2025-05-15 NOTE — PROGRESS NOTES
Echo order placed per Dr. Anderson. Patientt is seeing mary on 5/21 at 3:00 and Dr. Anderson wants to get started on testing.

## 2025-05-19 ENCOUNTER — ROUTINE PRENATAL (OUTPATIENT)
Dept: OBGYN | Age: 25
End: 2025-05-19

## 2025-05-19 ENCOUNTER — HOSPITAL ENCOUNTER (OUTPATIENT)
Age: 25
Setting detail: SPECIMEN
Discharge: HOME | End: 2025-05-19
Payer: COMMERCIAL

## 2025-05-19 VITALS
WEIGHT: 154.4 LBS | SYSTOLIC BLOOD PRESSURE: 122 MMHG | DIASTOLIC BLOOD PRESSURE: 78 MMHG | BODY MASS INDEX: 26.5 KG/M2 | HEART RATE: 101 BPM

## 2025-05-19 DIAGNOSIS — Z3A.36 36 WEEKS GESTATION OF PREGNANCY: ICD-10-CM

## 2025-05-19 DIAGNOSIS — Z3A.36 36 WEEKS GESTATION OF PREGNANCY: Primary | ICD-10-CM

## 2025-05-19 DIAGNOSIS — Z34.03 ENCOUNTER FOR SUPERVISION OF NORMAL FIRST PREGNANCY IN THIRD TRIMESTER: ICD-10-CM

## 2025-05-19 PROCEDURE — 87081 CULTURE SCREEN ONLY: CPT

## 2025-05-19 NOTE — PROGRESS NOTES
Fred Brown is here at 36w4d for:    Chief Complaint   Patient presents with    Routine Prenatal Visit     GBS.        Estimated Due Date: Estimated Date of Delivery: 25    OB History    Para Term  AB Living   1        SAB IAB Ectopic Molar Multiple Live Births              # Outcome Date GA Lbr Jerod/2nd Weight Sex Type Anes PTL Lv   1 Current                 Past Medical History:   Diagnosis Date    Migraine     Migraines        Past Surgical History:   Procedure Laterality Date    TONSILLECTOMY      WISDOM TOOTH EXTRACTION         Social History     Tobacco Use   Smoking Status Never   Smokeless Tobacco Never        Social History     Substance and Sexual Activity   Alcohol Use Not Currently               HPI: here for routine ob visit, denies c/o     PT denies fever, chills, nausea and vomiting       Vitals:  Estimated body mass index is 26.5 kg/m² as calculated from the following:    Height as of 22: 1.626 m (5' 4\").    Weight as of this encounter: 70 kg (154 lb 6.4 oz).  BP: 122/78  Weight - Scale: 70 kg (154 lb 6.4 oz)  Pulse: (!) 101  Patient Position: Sitting  Albumin: Negative  Glucose: Negative  Fundal Height (cm): 36 cm  Fetal HR: 147  Movement: Present  Presentation: Vertex  Dilation (cm): 1  Effacement: 80  Station: -1  Comments: 1-2 post           Abdomen: gravid,soft, nontender    Results reviewed today:    No results found for this visit on 25.        ASSESSMENT & Plan   Assessment & Plan  36 weeks gestation of pregnancy   Chronic, at goal (stable), continue current treatment plan    Orders:    Culture, Strep B Screen, Vaginal/Rectal; Future    Encounter for supervision of normal first pregnancy in third trimester   Chronic, at goal (stable), continue current treatment plan                 I am having Fred Brown maintain her Prenatal Vit-Fe Fumarate-FA (PRENATAL 19 PO), folic acid, and ondansetron.    Return in about 1 week (around 2025) for

## 2025-05-21 ENCOUNTER — OFFICE VISIT (OUTPATIENT)
Dept: CARDIOLOGY | Age: 25
End: 2025-05-21
Payer: COMMERCIAL

## 2025-05-21 VITALS
WEIGHT: 156 LBS | DIASTOLIC BLOOD PRESSURE: 74 MMHG | HEIGHT: 64 IN | BODY MASS INDEX: 26.63 KG/M2 | RESPIRATION RATE: 18 BRPM | SYSTOLIC BLOOD PRESSURE: 109 MMHG | HEART RATE: 94 BPM

## 2025-05-21 DIAGNOSIS — R94.31 ABNORMAL ECG: ICD-10-CM

## 2025-05-21 DIAGNOSIS — Z3A.36 36 WEEKS GESTATION OF PREGNANCY: ICD-10-CM

## 2025-05-21 DIAGNOSIS — R00.2 PALPITATIONS: Primary | ICD-10-CM

## 2025-05-21 DIAGNOSIS — R00.0 TACHYCARDIA: ICD-10-CM

## 2025-05-21 PROCEDURE — 93000 ELECTROCARDIOGRAM COMPLETE: CPT | Performed by: FAMILY MEDICINE

## 2025-05-21 PROCEDURE — G8427 DOCREV CUR MEDS BY ELIG CLIN: HCPCS | Performed by: FAMILY MEDICINE

## 2025-05-21 PROCEDURE — 99243 OFF/OP CNSLTJ NEW/EST LOW 30: CPT | Performed by: FAMILY MEDICINE

## 2025-05-21 PROCEDURE — G8419 CALC BMI OUT NRM PARAM NOF/U: HCPCS | Performed by: FAMILY MEDICINE

## 2025-05-21 NOTE — PROGRESS NOTES
I, Lyndsey España am scribing for and in the presence of Yordy Anderson MD, MS, F.A.C.C..    Patient: Fred Brown  : 2000  Date of Visit: May 21, 2025    REASON FOR VISIT / CONSULTATION: Establish Cardiologist (HX: Palpitations. Pt is here for est care for palps. Patient states that she does have shortness of breath and chest pain. She is dizzy often.)    History of Present Illness:        Dear SARAH Acevedo - CNP,     I had the pleasure of seeing Fred Brown today. Ms. Brown is a 24 y.o. female  with a history of intermittent palpitations. Grandmother had POTS, no other know family history of heart disease.     CAM monitor done 25: Sinus tachycardia was seen during 34% of the test duration. Consider the diagnosis of postural orthostatic tachycardia syndrome (POTS) and tilt table testing or inappropriate sinus tachycardia if clinically indicated.       EKG done 25: normal sinus rhythm with slightly reduced MN interval, may be normal variant.     Ms. Brown  is here today to establish care. She was referred by OBGYN for palpitations. She is 36 weeks pregnant. She reports that she did occasionally have palpitations before pregnancy but since has amplified. She does get chest pain that feels achy. She does have shortness of breath. She is dizzy often.     History of Present Illness  The patient is a 24-year-old female who presents for palpitations and to establish care.    She reports experiencing palpitations, which she initially attributed to her pregnancy. She is currently at 36 weeks' gestation and has been informed that she is 2 to 3 cm dilated, indicating the potential for imminent delivery. She also experiences shortness of breath, which she believes is related to her pregnancy. She describes occasional chest discomfort as an aching sensation, lasting from a few seconds to a minute, occurring sporadically throughout the month. This discomfort is not exacerbated by physical

## 2025-05-22 LAB
MICROORGANISM SPEC CULT: NORMAL
SERVICE CMNT-IMP: NORMAL
SPECIMEN DESCRIPTION: NORMAL

## 2025-05-27 ENCOUNTER — ROUTINE PRENATAL (OUTPATIENT)
Dept: OBGYN | Age: 25
End: 2025-05-27

## 2025-05-27 VITALS
WEIGHT: 153.6 LBS | HEART RATE: 89 BPM | SYSTOLIC BLOOD PRESSURE: 118 MMHG | DIASTOLIC BLOOD PRESSURE: 80 MMHG | BODY MASS INDEX: 26.37 KG/M2

## 2025-05-27 DIAGNOSIS — Z3A.37 37 WEEKS GESTATION OF PREGNANCY: ICD-10-CM

## 2025-05-27 DIAGNOSIS — Z34.03 ENCOUNTER FOR SUPERVISION OF NORMAL FIRST PREGNANCY IN THIRD TRIMESTER: Primary | ICD-10-CM

## 2025-05-27 PROCEDURE — 0502F SUBSEQUENT PRENATAL CARE: CPT | Performed by: ADVANCED PRACTICE MIDWIFE

## 2025-05-27 NOTE — PROGRESS NOTES
Fred Brown is here at 37w5d for:    Chief Complaint   Patient presents with    Routine Prenatal Visit     Decreased fetal movement several days ago but back to normal now. Some edema in feet and ankles.        Estimated Due Date: Estimated Date of Delivery: 25    OB History    Para Term  AB Living   1        SAB IAB Ectopic Molar Multiple Live Births              # Outcome Date GA Lbr Jerod/2nd Weight Sex Type Anes PTL Lv   1 Current                 Past Medical History:   Diagnosis Date    Migraine     Migraines        Past Surgical History:   Procedure Laterality Date    TONSILLECTOMY      WISDOM TOOTH EXTRACTION         Social History     Tobacco Use   Smoking Status Never   Smokeless Tobacco Never        Social History     Substance and Sexual Activity   Alcohol Use Not Currently               HPI: here for routine ob visit, denies c/o     PT denies fever, chills, nausea and vomiting       Vitals:  Estimated body mass index is 26.37 kg/m² as calculated from the following:    Height as of 25: 1.626 m (5' 4\").    Weight as of this encounter: 69.7 kg (153 lb 9.6 oz).  BP: 118/80  Weight - Scale: 69.7 kg (153 lb 9.6 oz)  Pulse: 89  Patient Position: Sitting  Albumin: Negative  Glucose: Negative  Fundal Height (cm): 36 cm  Fetal HR: 147  Movement: Present  Presentation: Vertex  Dilation (cm): 2  Effacement: 80  Station: -1           Abdomen: gravid,soft, nontender  Bilateral non pitting pedal edema    Results reviewed today:    No results found for this visit on 25.        ASSESSMENT & Plan   Assessment & Plan  Encounter for supervision of normal first pregnancy in third trimester   Chronic, at goal (stable), continue current treatment plan         37 weeks gestation of pregnancy   Chronic, at goal (stable), continue current treatment plan                 I am having Fred Brown maintain her Prenatal Vit-Fe Fumarate-FA (PRENATAL 19 PO), folic acid, and

## 2025-05-30 ENCOUNTER — HOSPITAL ENCOUNTER (OUTPATIENT)
Age: 25
Discharge: HOME | End: 2025-05-30
Payer: COMMERCIAL

## 2025-05-30 ENCOUNTER — RESULTS FOLLOW-UP (OUTPATIENT)
Dept: CARDIOLOGY | Age: 25
End: 2025-05-30

## 2025-05-30 VITALS
SYSTOLIC BLOOD PRESSURE: 118 MMHG | DIASTOLIC BLOOD PRESSURE: 80 MMHG | BODY MASS INDEX: 26.23 KG/M2 | HEIGHT: 64 IN | WEIGHT: 153.66 LBS

## 2025-05-30 DIAGNOSIS — R00.2 PALPITATIONS: ICD-10-CM

## 2025-05-30 DIAGNOSIS — I31.39 PERICARDIAL EFFUSION: Primary | ICD-10-CM

## 2025-05-30 LAB
ECHO AO ASC DIAM: 2.2 CM
ECHO AO ASCENDING AORTA INDEX: 1.26 CM/M2
ECHO AO ROOT DIAM: 1.6 CM
ECHO AO ROOT INDEX: 0.91 CM/M2
ECHO AO SINUS VALSALVA DIAM: 2.6 CM
ECHO AO SINUS VALSALVA INDEX: 1.49 CM/M2
ECHO AO ST JNCT DIAM: 1.8 CM
ECHO AV CUSP MM: 1.6 CM
ECHO AV MEAN GRADIENT: 4 MMHG
ECHO AV MEAN VELOCITY: 0.9 M/S
ECHO AV PEAK GRADIENT: 7 MMHG
ECHO AV PEAK VELOCITY: 1.3 M/S
ECHO AV VELOCITY RATIO: 0.69
ECHO AV VTI: 22.7 CM
ECHO BSA: 1.77 M2
ECHO EST RA PRESSURE: 3 MMHG
ECHO LA AREA 2C: 15.9 CM2
ECHO LA AREA 4C: 16 CM2
ECHO LA MAJOR AXIS: 5.3 CM
ECHO LA MINOR AXIS: 5 CM
ECHO LA VOL MOD A2C: 41 ML (ref 22–52)
ECHO LA VOL MOD A4C: 38 ML (ref 22–52)
ECHO LA VOLUME INDEX MOD A2C: 23 ML/M2 (ref 16–34)
ECHO LA VOLUME INDEX MOD A4C: 22 ML/M2 (ref 16–34)
ECHO LV E' LATERAL VELOCITY: 10.8 CM/S
ECHO LV EDV A2C: 72 ML
ECHO LV EDV A4C: 54 ML
ECHO LV EDV INDEX A4C: 31 ML/M2
ECHO LV EDV NDEX A2C: 41 ML/M2
ECHO LV EF PHYSICIAN: 60 %
ECHO LV EJECTION FRACTION A2C: 62 %
ECHO LV EJECTION FRACTION A4C: 57 %
ECHO LV ESV A2C: 28 ML
ECHO LV ESV A4C: 23 ML
ECHO LV ESV INDEX A2C: 16 ML/M2
ECHO LV ESV INDEX A4C: 13 ML/M2
ECHO LV FRACTIONAL SHORTENING: 31 % (ref 28–44)
ECHO LV INTERNAL DIMENSION DIASTOLE INDEX: 2.4 CM/M2
ECHO LV INTERNAL DIMENSION DIASTOLIC: 4.2 CM (ref 3.9–5.3)
ECHO LV INTERNAL DIMENSION SYSTOLIC INDEX: 1.66 CM/M2
ECHO LV INTERNAL DIMENSION SYSTOLIC: 2.9 CM
ECHO LV IVSD: 0.8 CM (ref 0.6–0.9)
ECHO LV MASS 2D: 101.3 G (ref 67–162)
ECHO LV MASS INDEX 2D: 57.9 G/M2 (ref 43–95)
ECHO LV POSTERIOR WALL DIASTOLIC: 0.8 CM (ref 0.6–0.9)
ECHO LV RELATIVE WALL THICKNESS RATIO: 0.38
ECHO LVOT AV VTI INDEX: 0.68
ECHO LVOT MEAN GRADIENT: 2 MMHG
ECHO LVOT PEAK GRADIENT: 3 MMHG
ECHO LVOT PEAK VELOCITY: 0.9 M/S
ECHO LVOT VTI: 15.4 CM
ECHO MV A VELOCITY: 0.76 M/S
ECHO MV E DECELERATION TIME (DT): 182 MS
ECHO MV E VELOCITY: 0.57 M/S
ECHO MV E/A RATIO: 0.75
ECHO MV E/E' LATERAL: 5.28
ECHO PULMONARY ARTERY END DIASTOLIC PRESSURE: 3 MMHG
ECHO PV MAX VELOCITY: 0.9 M/S
ECHO PV PEAK GRADIENT: 3 MMHG
ECHO PV REGURGITANT MAX VELOCITY: 0.9 M/S
ECHO RIGHT VENTRICULAR SYSTOLIC PRESSURE (RVSP): 20 MMHG
ECHO RV TAPSE: 2.4 CM (ref 1.7–?)
ECHO TV REGURGITANT MAX VELOCITY: 2.04 M/S
ECHO TV REGURGITANT PEAK GRADIENT: 17 MMHG

## 2025-05-30 PROCEDURE — 93306 TTE W/DOPPLER COMPLETE: CPT | Performed by: FAMILY MEDICINE

## 2025-05-30 PROCEDURE — 93306 TTE W/DOPPLER COMPLETE: CPT

## 2025-05-30 NOTE — RESULT ENCOUNTER NOTE
Please let patient know I discussed these results with Dr Anderson.  echo showed a normal heart strength but did also show small pericardial effusion (small fluid around the heart), which can be normal in pregnancy  please ask her if symptoms are stable and not getting worse, if no worse we will repeat an echocardiogram in a few weeks to re-assess this, however if they are getting worse we will repeat echocardiogram in 5-7 days. Thank you

## 2025-06-02 ENCOUNTER — ROUTINE PRENATAL (OUTPATIENT)
Dept: OBGYN | Age: 25
End: 2025-06-02

## 2025-06-02 VITALS
HEART RATE: 87 BPM | BODY MASS INDEX: 26.28 KG/M2 | DIASTOLIC BLOOD PRESSURE: 80 MMHG | WEIGHT: 153.2 LBS | SYSTOLIC BLOOD PRESSURE: 114 MMHG

## 2025-06-02 DIAGNOSIS — Z34.03 ENCOUNTER FOR SUPERVISION OF NORMAL FIRST PREGNANCY IN THIRD TRIMESTER: ICD-10-CM

## 2025-06-02 DIAGNOSIS — Z3A.38 38 WEEKS GESTATION OF PREGNANCY: Primary | ICD-10-CM

## 2025-06-02 PROCEDURE — 0502F SUBSEQUENT PRENATAL CARE: CPT | Performed by: ADVANCED PRACTICE MIDWIFE

## 2025-06-02 NOTE — TELEPHONE ENCOUNTER
----- Message from SARAH Davalos CNP sent at 5/30/2025  4:51 PM EDT -----  Please let patient know I discussed these results with Dr Anderson.  echo showed a normal heart strength but did also show small pericardial effusion (small fluid around the heart), which can be normal in pregnancy  please ask her if symptoms are stable and not getting worse, if no worse we will repeat an echocardiogram in a few weeks to re-assess this, however if they are getting worse we will repeat echocardiogram in 5-7 days. Thank you

## 2025-06-02 NOTE — PROGRESS NOTES
Fred Brown is here at 38w4d for:    Chief Complaint   Patient presents with    Routine Prenatal Visit     C/O off and on cramps. Losing mucous plug ?        Estimated Due Date: Estimated Date of Delivery: 25    OB History    Para Term  AB Living   1        SAB IAB Ectopic Molar Multiple Live Births              # Outcome Date GA Lbr Jerod/2nd Weight Sex Type Anes PTL Lv   1 Current                 Past Medical History:   Diagnosis Date    Migraine     Migraines        Past Surgical History:   Procedure Laterality Date    TONSILLECTOMY      WISDOM TOOTH EXTRACTION         Social History     Tobacco Use   Smoking Status Never   Smokeless Tobacco Never        Social History     Substance and Sexual Activity   Alcohol Use Not Currently               HPI: here for routine ob visit, denies c/o     PT denies fever, chills, nausea and vomiting       Vitals:  Estimated body mass index is 26.28 kg/m² as calculated from the following:    Height as of 25: 1.626 m (5' 4.02\").    Weight as of this encounter: 69.5 kg (153 lb 3.2 oz).  BP: 114/80  Weight - Scale: 69.5 kg (153 lb 3.2 oz)  Pulse: 87  Patient Position: Sitting  Albumin: Negative  Glucose: Negative  Fundal Height (cm): 36 cm  Fetal HR: 148  Movement: Present  Presentation: Vertex  Dilation (cm): 2  Effacement: 80  Station: -1           Abdomen: gravid,soft, nontender    Results reviewed today:    No results found for this visit on 25.        ASSESSMENT & Plan   Assessment & Plan  38 weeks gestation of pregnancy   Chronic, at goal (stable), continue current treatment plan         Encounter for supervision of normal first pregnancy in third trimester   Chronic, at goal (stable), continue current treatment plan                 I am having Fred Brown maintain her Prenatal Vit-Fe Fumarate-FA (PRENATAL 19 PO), folic acid, and ondansetron.    Return in about 1 week (around 2025) for ob with nst.    There are no Patient

## 2025-06-04 ENCOUNTER — TELEPHONE (OUTPATIENT)
Dept: OBGYN | Age: 25
End: 2025-06-04

## 2025-06-04 NOTE — TELEPHONE ENCOUNTER
Lor was unavailable to check with, checked with midwife in office, Deonna.    Stated pt may be in early stages of labor and to monitor symptoms and wait till contractions are closer together are stronger to where she is panting / breathing through them    Pt agreeable and will monitor at this time.

## 2025-06-04 NOTE — TELEPHONE ENCOUNTER
Pt called in and stated since 8 am she is having contractions, anywhere from 3-9 minutes apart.      Pt states they were anywhere from 20 seconds to a minute long. Only mildly crampy. They are not doubling her over in pain.    C/o lower back pain.    Denies ROM or leaking.     Pt is 38w6d.

## 2025-06-09 ENCOUNTER — ROUTINE PRENATAL (OUTPATIENT)
Dept: OBGYN | Age: 25
End: 2025-06-09

## 2025-06-09 VITALS
WEIGHT: 155 LBS | HEART RATE: 102 BPM | SYSTOLIC BLOOD PRESSURE: 106 MMHG | DIASTOLIC BLOOD PRESSURE: 70 MMHG | BODY MASS INDEX: 26.59 KG/M2

## 2025-06-09 DIAGNOSIS — Z34.03 ENCOUNTER FOR SUPERVISION OF NORMAL FIRST PREGNANCY IN THIRD TRIMESTER: ICD-10-CM

## 2025-06-09 DIAGNOSIS — Z3A.39 39 WEEKS GESTATION OF PREGNANCY: Primary | ICD-10-CM

## 2025-06-09 PROCEDURE — 0502F SUBSEQUENT PRENATAL CARE: CPT | Performed by: ADVANCED PRACTICE MIDWIFE

## 2025-06-09 NOTE — PROGRESS NOTES
Fred Brown is here at 39w4d for:    Chief Complaint   Patient presents with    Routine Prenatal Visit     Patient is being seen for NST monitoring, SVE.        Estimated Due Date: Estimated Date of Delivery: 25    OB History    Para Term  AB Living   1        SAB IAB Ectopic Molar Multiple Live Births              # Outcome Date GA Lbr Jerod/2nd Weight Sex Type Anes PTL Lv   1 Current                 Past Medical History:   Diagnosis Date    Migraine     Migraines        Past Surgical History:   Procedure Laterality Date    TONSILLECTOMY      WISDOM TOOTH EXTRACTION         Social History     Tobacco Use   Smoking Status Never   Smokeless Tobacco Never        Social History     Substance and Sexual Activity   Alcohol Use Not Currently               HPI: here for routine ob visit, was scheduled with NST for approaching EDC     PT denies fever, chills, nausea and vomiting       Vitals:  Estimated body mass index is 26.59 kg/m² as calculated from the following:    Height as of 25: 1.626 m (5' 4.02\").    Weight as of this encounter: 70.3 kg (155 lb).  BP: 106/70  Weight - Scale: 70.3 kg (155 lb)  Pulse: (!) 102  Patient Position: Sitting  Albumin: Negative  Glucose: Negative  Fundal Height (cm): 36 cm  Fetal HR: nst  Movement: Present  Presentation: Vertex  Dilation (cm): 2  Effacement: 90  Station: -1           Abdomen: gravid,soft, nontender    Results reviewed today:  Fred Brown is here at 39w4d for an NST due to term delivery near due date    FHT;       Baseline Heart Rate:  130        Accelerations:  present       Long Term Variability:  moderate       Decelerations:  absent         Contraction frequency: denies minutes    NST Time start:  1002  NST Time stop: 1028    NST: reactive      No results found for this visit on 25.        ASSESSMENT & Plan   Assessment & Plan  39 weeks gestation of pregnancy   Chronic, at goal (stable), continue current treatment plan

## 2025-06-10 ENCOUNTER — HOSPITAL ENCOUNTER (INPATIENT)
Age: 25
LOS: 2 days | Discharge: HOME | End: 2025-06-12
Attending: ADVANCED PRACTICE MIDWIFE | Admitting: ADVANCED PRACTICE MIDWIFE
Payer: COMMERCIAL

## 2025-06-10 ENCOUNTER — ANESTHESIA EVENT (OUTPATIENT)
Dept: LABOR AND DELIVERY | Age: 25
End: 2025-06-10
Payer: COMMERCIAL

## 2025-06-10 ENCOUNTER — ANESTHESIA (OUTPATIENT)
Dept: LABOR AND DELIVERY | Age: 25
End: 2025-06-10
Payer: COMMERCIAL

## 2025-06-10 PROBLEM — O47.9 UTERINE CONTRACTIONS: Status: ACTIVE | Noted: 2025-06-10

## 2025-06-10 LAB
ABO + RH BLD: NORMAL
AMPHET UR QL SCN: NEGATIVE
ARM BAND NUMBER: NORMAL
BARBITURATES UR QL SCN: NEGATIVE
BASOPHILS # BLD: <0.03 K/UL (ref 0–0.2)
BASOPHILS NFR BLD: 0 % (ref 0–2)
BENZODIAZ UR QL: NEGATIVE
BILIRUB UR QL STRIP: NEGATIVE
BLOOD BANK SAMPLE EXPIRATION: NORMAL
BLOOD GROUP ANTIBODIES SERPL: NEGATIVE
CANNABINOIDS UR QL SCN: NEGATIVE
CLARITY UR: CLEAR
COCAINE UR QL SCN: NEGATIVE
COLOR UR: YELLOW
EOSINOPHIL # BLD: 0.06 K/UL (ref 0–0.44)
EOSINOPHILS RELATIVE PERCENT: 1 % (ref 1–4)
ERYTHROCYTE [DISTWIDTH] IN BLOOD BY AUTOMATED COUNT: 13.5 % (ref 11.8–14.4)
FENTANYL UR QL: NEGATIVE
GLUCOSE UR STRIP-MCNC: NEGATIVE MG/DL
HCT VFR BLD AUTO: 36.9 % (ref 36.3–47.1)
HGB BLD-MCNC: 12.1 G/DL (ref 11.9–15.1)
HGB UR QL STRIP.AUTO: NEGATIVE
IMM GRANULOCYTES # BLD AUTO: <0.03 K/UL (ref 0–0.3)
IMM GRANULOCYTES NFR BLD: 0 %
KETONES UR STRIP-MCNC: NEGATIVE MG/DL
LEUKOCYTE ESTERASE UR QL STRIP: NEGATIVE
LYMPHOCYTES NFR BLD: 2.28 K/UL (ref 1.1–3.7)
LYMPHOCYTES RELATIVE PERCENT: 24 % (ref 24–43)
MCH RBC QN AUTO: 28.1 PG (ref 25.2–33.5)
MCHC RBC AUTO-ENTMCNC: 32.8 G/DL (ref 28.4–34.8)
MCV RBC AUTO: 85.6 FL (ref 82.6–102.9)
METHADONE UR QL: NEGATIVE
MONOCYTES NFR BLD: 0.99 K/UL (ref 0.1–1.2)
MONOCYTES NFR BLD: 10 % (ref 3–12)
NEUTROPHILS NFR BLD: 65 % (ref 36–65)
NEUTS SEG NFR BLD: 6.25 K/UL (ref 1.5–8.1)
NITRITE UR QL STRIP: NEGATIVE
NRBC BLD-RTO: 0 PER 100 WBC
OPIATES UR QL SCN: NEGATIVE
OXYCODONE UR QL SCN: NEGATIVE
PCP UR QL SCN: NEGATIVE
PH UR STRIP: 7 [PH] (ref 5–9)
PLATELET # BLD AUTO: 287 K/UL (ref 138–453)
PMV BLD AUTO: 11.1 FL (ref 8.1–13.5)
PROT UR STRIP-MCNC: NEGATIVE MG/DL
RBC # BLD AUTO: 4.31 M/UL (ref 3.95–5.11)
SP GR UR STRIP: 1.01 (ref 1.01–1.02)
TEST INFORMATION: NORMAL
UROBILINOGEN UR STRIP-ACNC: NORMAL EU/DL (ref 0–1)
WBC OTHER # BLD: 9.6 K/UL (ref 3.5–11.3)

## 2025-06-10 PROCEDURE — 85025 COMPLETE CBC W/AUTO DIFF WBC: CPT

## 2025-06-10 PROCEDURE — 2580000003 HC RX 258: Performed by: ADVANCED PRACTICE MIDWIFE

## 2025-06-10 PROCEDURE — 86901 BLOOD TYPING SEROLOGIC RH(D): CPT

## 2025-06-10 PROCEDURE — 3700000025 EPIDURAL BLOCK

## 2025-06-10 PROCEDURE — 86850 RBC ANTIBODY SCREEN: CPT

## 2025-06-10 PROCEDURE — 6360000002 HC RX W HCPCS

## 2025-06-10 PROCEDURE — 0HQ9XZZ REPAIR PERINEUM SKIN, EXTERNAL APPROACH: ICD-10-PCS | Performed by: ADVANCED PRACTICE MIDWIFE

## 2025-06-10 PROCEDURE — 51702 INSERT TEMP BLADDER CATH: CPT

## 2025-06-10 PROCEDURE — 86900 BLOOD TYPING SEROLOGIC ABO: CPT

## 2025-06-10 PROCEDURE — 7200000001 HC VAGINAL DELIVERY

## 2025-06-10 PROCEDURE — 59400 OBSTETRICAL CARE: CPT | Performed by: ADVANCED PRACTICE MIDWIFE

## 2025-06-10 PROCEDURE — 6360000002 HC RX W HCPCS: Performed by: ADVANCED PRACTICE MIDWIFE

## 2025-06-10 PROCEDURE — 6370000000 HC RX 637 (ALT 250 FOR IP): Performed by: ADVANCED PRACTICE MIDWIFE

## 2025-06-10 PROCEDURE — 80307 DRUG TEST PRSMV CHEM ANLYZR: CPT

## 2025-06-10 PROCEDURE — 1220000000 HC SEMI PRIVATE OB R&B

## 2025-06-10 PROCEDURE — 81003 URINALYSIS AUTO W/O SCOPE: CPT

## 2025-06-10 RX ORDER — METHYLERGONOVINE MALEATE 0.2 MG/ML
200 INJECTION INTRAVENOUS PRN
Status: DISCONTINUED | OUTPATIENT
Start: 2025-06-10 | End: 2025-06-12 | Stop reason: HOSPADM

## 2025-06-10 RX ORDER — ROPIVACAINE HYDROCHLORIDE 2 MG/ML
10 INJECTION, SOLUTION EPIDURAL; INFILTRATION; PERINEURAL CONTINUOUS
Status: DISCONTINUED | OUTPATIENT
Start: 2025-06-10 | End: 2025-06-10

## 2025-06-10 RX ORDER — LIDOCAINE HYDROCHLORIDE 20 MG/ML
15 SOLUTION OROPHARYNGEAL
Status: DISCONTINUED | OUTPATIENT
Start: 2025-06-10 | End: 2025-06-10

## 2025-06-10 RX ORDER — LIDOCAINE HYDROCHLORIDE 10 MG/ML
30 INJECTION, SOLUTION EPIDURAL; INFILTRATION; INTRACAUDAL; PERINEURAL PRN
Status: DISCONTINUED | OUTPATIENT
Start: 2025-06-10 | End: 2025-06-10

## 2025-06-10 RX ORDER — ONDANSETRON 4 MG/1
4 TABLET, ORALLY DISINTEGRATING ORAL EVERY 6 HOURS PRN
Status: DISCONTINUED | OUTPATIENT
Start: 2025-06-10 | End: 2025-06-10

## 2025-06-10 RX ORDER — ONDANSETRON 4 MG/1
4 TABLET, ORALLY DISINTEGRATING ORAL EVERY 6 HOURS PRN
Status: DISCONTINUED | OUTPATIENT
Start: 2025-06-10 | End: 2025-06-12 | Stop reason: HOSPADM

## 2025-06-10 RX ORDER — DOCUSATE SODIUM 100 MG/1
100 CAPSULE, LIQUID FILLED ORAL 2 TIMES DAILY PRN
Status: DISCONTINUED | OUTPATIENT
Start: 2025-06-10 | End: 2025-06-12 | Stop reason: HOSPADM

## 2025-06-10 RX ORDER — SODIUM CHLORIDE 0.9 % (FLUSH) 0.9 %
5-40 SYRINGE (ML) INJECTION EVERY 12 HOURS SCHEDULED
Status: DISCONTINUED | OUTPATIENT
Start: 2025-06-10 | End: 2025-06-10

## 2025-06-10 RX ORDER — METHYLERGONOVINE MALEATE 0.2 MG/ML
200 INJECTION INTRAVENOUS PRN
Status: DISCONTINUED | OUTPATIENT
Start: 2025-06-10 | End: 2025-06-10 | Stop reason: SDUPTHER

## 2025-06-10 RX ORDER — ONDANSETRON 2 MG/ML
4 INJECTION INTRAMUSCULAR; INTRAVENOUS EVERY 6 HOURS PRN
Status: DISCONTINUED | OUTPATIENT
Start: 2025-06-10 | End: 2025-06-10

## 2025-06-10 RX ORDER — EPHEDRINE SULFATE/0.9% NACL/PF 25 MG/5 ML
5 SYRINGE (ML) INTRAVENOUS EVERY 5 MIN PRN
Status: DISCONTINUED | OUTPATIENT
Start: 2025-06-10 | End: 2025-06-10

## 2025-06-10 RX ORDER — NALBUPHINE HYDROCHLORIDE 10 MG/ML
10 INJECTION INTRAMUSCULAR; INTRAVENOUS; SUBCUTANEOUS
Status: DISCONTINUED | OUTPATIENT
Start: 2025-06-10 | End: 2025-06-10

## 2025-06-10 RX ORDER — ROPIVACAINE HYDROCHLORIDE 2 MG/ML
INJECTION, SOLUTION EPIDURAL; INFILTRATION; PERINEURAL
Status: DISCONTINUED | OUTPATIENT
Start: 2025-06-10 | End: 2025-06-10 | Stop reason: SDUPTHER

## 2025-06-10 RX ORDER — ACETAMINOPHEN 500 MG
1000 TABLET ORAL EVERY 6 HOURS PRN
Status: DISCONTINUED | OUTPATIENT
Start: 2025-06-10 | End: 2025-06-12 | Stop reason: HOSPADM

## 2025-06-10 RX ORDER — SEVOFLURANE 250 ML/250ML
1 LIQUID RESPIRATORY (INHALATION) CONTINUOUS PRN
Status: DISCONTINUED | OUTPATIENT
Start: 2025-06-10 | End: 2025-06-10

## 2025-06-10 RX ORDER — SODIUM CHLORIDE, SODIUM LACTATE, POTASSIUM CHLORIDE, CALCIUM CHLORIDE 600; 310; 30; 20 MG/100ML; MG/100ML; MG/100ML; MG/100ML
INJECTION, SOLUTION INTRAVENOUS CONTINUOUS
Status: DISCONTINUED | OUTPATIENT
Start: 2025-06-10 | End: 2025-06-10

## 2025-06-10 RX ORDER — CARBOPROST TROMETHAMINE 250 UG/ML
250 INJECTION, SOLUTION INTRAMUSCULAR PRN
Status: DISCONTINUED | OUTPATIENT
Start: 2025-06-10 | End: 2025-06-12 | Stop reason: HOSPADM

## 2025-06-10 RX ORDER — ACETAMINOPHEN 325 MG/1
650 TABLET ORAL EVERY 4 HOURS PRN
Status: DISCONTINUED | OUTPATIENT
Start: 2025-06-10 | End: 2025-06-10

## 2025-06-10 RX ORDER — SODIUM CHLORIDE 0.9 % (FLUSH) 0.9 %
5-40 SYRINGE (ML) INJECTION EVERY 12 HOURS SCHEDULED
Status: DISCONTINUED | OUTPATIENT
Start: 2025-06-10 | End: 2025-06-12 | Stop reason: HOSPADM

## 2025-06-10 RX ORDER — SODIUM CHLORIDE, SODIUM LACTATE, POTASSIUM CHLORIDE, AND CALCIUM CHLORIDE .6; .31; .03; .02 G/100ML; G/100ML; G/100ML; G/100ML
1000 INJECTION, SOLUTION INTRAVENOUS PRN
Status: DISCONTINUED | OUTPATIENT
Start: 2025-06-10 | End: 2025-06-10

## 2025-06-10 RX ORDER — IBUPROFEN 800 MG/1
800 TABLET, FILM COATED ORAL EVERY 8 HOURS PRN
Status: DISCONTINUED | OUTPATIENT
Start: 2025-06-10 | End: 2025-06-12 | Stop reason: HOSPADM

## 2025-06-10 RX ORDER — MISOPROSTOL 100 UG/1
800 TABLET ORAL PRN
Status: DISCONTINUED | OUTPATIENT
Start: 2025-06-10 | End: 2025-06-12 | Stop reason: HOSPADM

## 2025-06-10 RX ORDER — SODIUM CHLORIDE 9 MG/ML
INJECTION, SOLUTION INTRAVENOUS PRN
Status: DISCONTINUED | OUTPATIENT
Start: 2025-06-10 | End: 2025-06-10

## 2025-06-10 RX ORDER — SODIUM CHLORIDE 0.9 % (FLUSH) 0.9 %
5-40 SYRINGE (ML) INJECTION PRN
Status: DISCONTINUED | OUTPATIENT
Start: 2025-06-10 | End: 2025-06-10

## 2025-06-10 RX ORDER — SODIUM CHLORIDE, SODIUM LACTATE, POTASSIUM CHLORIDE, AND CALCIUM CHLORIDE .6; .31; .03; .02 G/100ML; G/100ML; G/100ML; G/100ML
500 INJECTION, SOLUTION INTRAVENOUS PRN
Status: DISCONTINUED | OUTPATIENT
Start: 2025-06-10 | End: 2025-06-10

## 2025-06-10 RX ORDER — NALOXONE HYDROCHLORIDE 0.4 MG/ML
INJECTION, SOLUTION INTRAMUSCULAR; INTRAVENOUS; SUBCUTANEOUS PRN
Status: DISCONTINUED | OUTPATIENT
Start: 2025-06-10 | End: 2025-06-10

## 2025-06-10 RX ORDER — MODIFIED LANOLIN
OINTMENT (GRAM) TOPICAL PRN
Status: DISCONTINUED | OUTPATIENT
Start: 2025-06-10 | End: 2025-06-12 | Stop reason: HOSPADM

## 2025-06-10 RX ORDER — ONDANSETRON 2 MG/ML
4 INJECTION INTRAMUSCULAR; INTRAVENOUS EVERY 6 HOURS PRN
Status: DISCONTINUED | OUTPATIENT
Start: 2025-06-10 | End: 2025-06-12 | Stop reason: HOSPADM

## 2025-06-10 RX ORDER — LIDOCAINE HYDROCHLORIDE 20 MG/ML
INJECTION, SOLUTION EPIDURAL; INFILTRATION; INTRACAUDAL; PERINEURAL
Status: DISCONTINUED | OUTPATIENT
Start: 2025-06-10 | End: 2025-06-10 | Stop reason: SDUPTHER

## 2025-06-10 RX ORDER — CARBOPROST TROMETHAMINE 250 UG/ML
250 INJECTION, SOLUTION INTRAMUSCULAR PRN
Status: DISCONTINUED | OUTPATIENT
Start: 2025-06-10 | End: 2025-06-10 | Stop reason: SDUPTHER

## 2025-06-10 RX ADMIN — ACETAMINOPHEN 1000 MG: 500 TABLET ORAL at 15:00

## 2025-06-10 RX ADMIN — ROPIVACAINE HYDROCHLORIDE 10 ML/HR: 2 INJECTION, SOLUTION EPIDURAL; INFILTRATION at 03:06

## 2025-06-10 RX ADMIN — ROPIVACAINE HYDROCHLORIDE 10 ML/HR: 2 INJECTION, SOLUTION EPIDURAL; INFILTRATION at 06:29

## 2025-06-10 RX ADMIN — DOCUSATE SODIUM 100 MG: 100 CAPSULE, LIQUID FILLED ORAL at 19:56

## 2025-06-10 RX ADMIN — WITCH HAZEL: 500 SOLUTION RECTAL; TOPICAL at 13:45

## 2025-06-10 RX ADMIN — BENZOCAINE AND LEVOMENTHOL: 200; 5 SPRAY TOPICAL at 13:43

## 2025-06-10 RX ADMIN — SODIUM CHLORIDE, SODIUM LACTATE, POTASSIUM CHLORIDE, AND CALCIUM CHLORIDE: .6; .31; .03; .02 INJECTION, SOLUTION INTRAVENOUS at 09:40

## 2025-06-10 RX ADMIN — LIDOCAINE HYDROCHLORIDE 2.5 ML: 20 INJECTION, SOLUTION EPIDURAL; INFILTRATION; INTRACAUDAL; PERINEURAL at 02:58

## 2025-06-10 RX ADMIN — ONDANSETRON 4 MG: 2 INJECTION, SOLUTION INTRAMUSCULAR; INTRAVENOUS at 05:26

## 2025-06-10 RX ADMIN — LIDOCAINE HYDROCHLORIDE 15 ML: 20 SOLUTION OROPHARYNGEAL at 11:00

## 2025-06-10 RX ADMIN — IBUPROFEN 800 MG: 800 TABLET, FILM COATED ORAL at 19:56

## 2025-06-10 RX ADMIN — LIDOCAINE HYDROCHLORIDE 2.5 ML: 20 INJECTION, SOLUTION EPIDURAL; INFILTRATION; INTRACAUDAL; PERINEURAL at 03:03

## 2025-06-10 ASSESSMENT — PAIN - FUNCTIONAL ASSESSMENT: PAIN_FUNCTIONAL_ASSESSMENT: ACTIVITIES ARE NOT PREVENTED

## 2025-06-10 ASSESSMENT — LIFESTYLE VARIABLES: SMOKING_STATUS: 0

## 2025-06-10 ASSESSMENT — PAIN DESCRIPTION - LOCATION: LOCATION: ABDOMEN;PERINEUM

## 2025-06-10 ASSESSMENT — PAIN DESCRIPTION - DESCRIPTORS: DESCRIPTORS: ACHING

## 2025-06-10 ASSESSMENT — PAIN SCALES - GENERAL: PAINLEVEL_OUTOF10: 5

## 2025-06-10 NOTE — FLOWSHEET NOTE
Patient arrives on unit c/o ROM. States around 0030 she felt a trickle and has been having more since. States ctx have worsened since ROM. Patient denies any vaginal bleeding and reports +FM.

## 2025-06-10 NOTE — ANESTHESIA PROCEDURE NOTES
Epidural Block    Patient location during procedure: OB  Start time: 6/10/2025 2:47 AM  End time: 6/10/2025 2:57 AM  Reason for block: labor epidural  Staffing  Performed: resident/CRNA   Resident/CRNA: Payal Richards APRN - CRNA  Performed by: Payal Richards APRN - CRNA  Authorized by: Payal Richards APRN - CRNA    Epidural  Patient position: sitting  Prep: ChloraPrep  Patient monitoring: cardiac monitor, continuous pulse ox and frequent blood pressure checks  Approach: midline  Location: L3-4  Injection technique: NAKIA saline  Provider prep: mask and sterile gloves  Needle  Needle type: Tuohy   Needle gauge: 17 G  Needle length: 3.5 in  Needle insertion depth: 5.5 cm  Catheter type: side hole  Catheter size: 19 G  Catheter at skin depth: 11 cm  Test dose: negative  Lot number: [id number]  Expiration date: 3/31/2026Catheter Secured: tegaderm and tape  Assessment  Sensory level: T8  Hemodynamics: stable  Attempts: 1  Outcomes: uncomplicated and patient tolerated procedure well  Additional Notes  Test dose given @: 0254  4cc 2% lidocaine with 1:200,000 epi    Pump settings:  10cc/hr  5cc demand dose  Lockout 15 minutes  Preanesthetic Checklist  Completed: patient identified, IV checked, risks and benefits discussed, surgical/procedural consents, equipment checked, pre-op evaluation, timeout performed, anesthesia consent given, oxygen available, monitors applied/VS acknowledged, fire risk safety assessment completed and verbalized and blood product R/B/A discussed and consented

## 2025-06-10 NOTE — L&D DELIVERY NOTE
Leon Brown [[id number]]      Labor Events     Labor: No   Steroids: None  Cervical Ripening Date/Time:      Antibiotics Received during Labor: No  Rupture Identifier: Sac 1  Rupture Date/Time:  6/10/25 00:30:00   Rupture Type: SROM  Fluid Color: Clear  Fluid Odor: None  Fluid Volume: Moderate  Induction: None  Labor Complications: None       Anesthesia    Method: Epidural       Labor Event Times      Labor onset date/time:  6/10/25 00:30:00     Dilation complete date/time:  6/10/25 08:52:00 EDT     Start pushing date/time:  6/10/2025 10:11:00   Decision date/time (emergent ):            Labor Length    1st stage: 8h 22m  2nd stage: 2h 11m  3rd stage: 0h 07m       Delivery Details      Delivery Date: 6/10/25 Delivery Time: 11:03:00   Delivery Type: Vaginal, Spontaneous              [facility] Presentation    Presentation: Vertex  Position: Right  _: Occiput  _: Anterior       Shoulder Dystocia    Shoulder Dystocia Present?: No       Assisted Delivery Details    Forceps Attempted?: No  Vacuum Extractor Attempted?: No                           Cord    Vessels: 3 Vessels  Complications: Nuchal Loose  Delayed Cord Clamping?: Yes  Cord Clamped Date/Time: 6/10/2025 11:09:21  Cord Blood Disposition: Lab  Gases Sent?: No   Cord Comments:  PROCEDURAL - OBSTETRIC - CORD OBSERVATION   cord blood and cord segment to lab             Placenta    Date/Time: 6/10/2025 11:10:57  Removal: Spontaneous  Appearance: Intact  Disposition: Discarded       Lacerations    Episiotomy: None  Perineal Lacerations: 1st  Other Lacerations: no non-perineal laceration  Number of Repair Packets: 1       Vaginal Counts    Initial Count Personnel: LUCIA DELGADILLO RN  Initial Count Verified By: LARA OSCAR RN  Intial Sponge Count: Correct Intial Needles Count: Correct Intial Instruments Count: Correct   Final Count Personnel: MARKIE BATISTA CNM  Final Count Verified By: LARA OSCAR RN  Accurate Final Count?: Yes       Blood Loss  Mother:

## 2025-06-10 NOTE — ANESTHESIA PRE PROCEDURE
Department of Anesthesiology  Preprocedure Note       Name:  Fred Brown   Age:  24 y.o.  :  2000                                          MRN:  [id number]         Date:  6/10/2025      Surgeon: * No surgeons listed *    Procedure: * No procedures listed *    Medications prior to admission:   Prior to Admission medications    Medication Sig Start Date End Date Taking? Authorizing Provider   ondansetron (ZOFRAN) 4 MG tablet Take 1 tablet by mouth every 8 hours as needed for Nausea or Vomiting 25  Yes Lor Quiroz APRN - CNM   Prenatal Vit-Fe Fumarate-FA (PRENATAL 19 PO) Take by mouth   Yes ProviderFarhad MD   folic acid (FOLVITE) 800 MCG tablet Take 1 tablet by mouth daily   Yes ProviderFarhad MD       Current medications:    Current Facility-Administered Medications   Medication Dose Route Frequency Provider Last Rate Last Admin    lactated ringers bolus 500 mL  500 mL IntraVENous PRN Lor Quiroz APRN - CNM        Or    lactated ringers bolus 1,000 mL  1,000 mL IntraVENous PRN Lor Quiroz APRN - CNM        sodium chloride flush 0.9 % injection 5-40 mL  5-40 mL IntraVENous 2 times per day Lor Quiroz APRN - CNM        sodium chloride flush 0.9 % injection 5-40 mL  5-40 mL IntraVENous PRN Lor Quiroz APRN - CNM        0.9 % sodium chloride infusion   IntraVENous PRN Lor Quiroz APRN - CNM        lidocaine PF 1 % injection 30 mL  30 mL Other PRN Lor Quiroz APRN - CNM        nalbuphine (NUBAIN) injection 10 mg  10 mg IntraVENous Q2H PRN Lor Quiroz APRN - CNM        nitrous oxide 50% inhalation 1 each  1 each Inhalation Continuous PRN Lor Quiroz APRN - CNM        ondansetron (ZOFRAN) injection 4 mg  4 mg IntraVENous Q6H PRN Lor Quiroz APRN - CNM        Or    ondansetron (ZOFRAN-ODT) disintegrating tablet 4 mg  4 mg Oral Q6H PRN Lor Quiroz APRN - CNM        oxytocin

## 2025-06-10 NOTE — FLOWSHEET NOTE
0243 Payal RAMAN at bedside  0244 consent signed  0247 time out completed  0254 test dose given  0300 patient tilted to left side

## 2025-06-10 NOTE — LACTATION NOTE
Lactation education:    [x] Latch/ good latch vs shallow latch/ steps to obtaining deep latch    [x] How to know if infant is eating enough/ feedings per 24 hours, wet/dirty diapers    [x] Feeding/satiety cues      Lactation education resources given:     [x]  How to Breastfeed your baby - [facility] publication      [x]  Follow up support information/flange fits guide    [x]  Breast milk storage guidelines - [facility]    [x]  Breastpump cleaning guidelines - [facility]     [x]  Breastfeeding & Safe Sleep handout - [facility] publication    [x]  Paced feeding and volume guidelines      Supplies given:    []  Brush, soap and basin for breastpump cleaning    []  Insurance pump provided     []  [facility] pump set up for patient to use    Explained to patient, patient verbalizes understanding.        Signed:  Payal Goyal RN, BSN, IBCLC

## 2025-06-11 PROCEDURE — 1220000000 HC SEMI PRIVATE OB R&B

## 2025-06-11 PROCEDURE — 6370000000 HC RX 637 (ALT 250 FOR IP): Performed by: ADVANCED PRACTICE MIDWIFE

## 2025-06-11 PROCEDURE — 99024 POSTOP FOLLOW-UP VISIT: CPT | Performed by: ADVANCED PRACTICE MIDWIFE

## 2025-06-11 RX ADMIN — DOCUSATE SODIUM 100 MG: 100 CAPSULE, LIQUID FILLED ORAL at 14:38

## 2025-06-11 RX ADMIN — ACETAMINOPHEN 1000 MG: 500 TABLET ORAL at 14:38

## 2025-06-11 NOTE — PROGRESS NOTES
Department of Obstetrics and Gynecology  Labor and Delivery       Post Partum Progress Note             SUBJECTIVE:  1st day postpartum s/p , denies c/o    OBJECTIVE:      Vitals:  BP (!) 106/56   Pulse 68   Temp 97.8 °F (36.6 °C) (Oral)   Resp 16   LMP 2024   SpO2 99%   Breastfeeding Unknown   Patient Vitals for the past 24 hrs:   BP Temp Temp src Pulse Resp SpO2   25 0730 (!) 106/56 97.8 °F (36.6 °C) Oral 68 16 99 %   25 0422 (!) 105/59 98.4 °F (36.9 °C) Oral 60 16 --   25 0232 119/67 98.4 °F (36.9 °C) Oral 72 18 99 %   06/10/25 1923 110/69 98.1 °F (36.7 °C) Oral 65 16 97 %   06/10/25 1616 118/72 98.2 °F (36.8 °C) Oral 75 16 97 %   06/10/25 1310 118/79 -- -- -- -- --   06/10/25 1255 116/74 -- -- 81 -- --   06/10/25 1240 122/87 -- -- 98 -- --   06/10/25 1225 126/83 -- -- 71 18 --   06/10/25 1210 (!) 126/91 -- -- 78 20 --   06/10/25 1155 126/86 -- -- 82 18 --   06/10/25 1140 125/86 -- -- 82 20 --   06/10/25 1125 130/85 -- -- 75 20 --   06/10/25 1110 135/81 98.5 °F (36.9 °C) -- 87 18 --   06/10/25 0919 -- -- -- -- -- 99 %   06/10/25 0900 (!) 136/94 -- -- -- -- 99 %   06/10/25 0848 (!) 132/92 -- -- 86 -- --         ABDOMEN: normal shape, position and consistency  GENITAL/URINARY:  External Genitalia:  General appearance; normal, Hair distribution; normal, Lesions absent  Uterus:  Size normal, Tenderness absent  Breast:normal appearance, no masses or tenderness  Cor: RRR no Murmurs  Pulmonary: clear to auscultation anterior and posterior  Extremities: no Clubbing cyanosis or ecchymosis    DATA:    CBC with Differential:    Lab Results   Component Value Date/Time    WBC 9.6 06/10/2025 02:17 AM    RBC 4.31 06/10/2025 02:17 AM    RBC 4.88 2022 10:39 AM    HGB 12.1 06/10/2025 02:17 AM    HCT 36.9 06/10/2025 02:17 AM     06/10/2025 02:17 AM    MCV 85.6 06/10/2025 02:17 AM    MCH 28.1 06/10/2025 02:17 AM    MCHC 32.8 06/10/2025 02:17 AM    RDW 13.5 06/10/2025 02:17 AM

## 2025-06-11 NOTE — ANESTHESIA POSTPROCEDURE EVALUATION
Department of Anesthesiology  Postprocedure Note    Patient: Fred Brown  MRN: [id number]  YOB: 2000  Date of evaluation: 6/11/2025    Procedure Summary       Date: 06/10/25 Room / Location:     Anesthesia Start: 0243 Anesthesia Stop: 1103    Procedure: Labor Analgesia Diagnosis:     Scheduled Providers:  Responsible Provider: Pyaal Richards APRN - CRNA    Anesthesia Type: epidural ASA Status: 2            Anesthesia Type: No value filed.    Nicole Phase I: Nicole Score: 10    Nicole Phase II:      Anesthesia Post Evaluation    Patient location during evaluation: PACU  Patient participation: complete - patient participated  Level of consciousness: awake and alert  Pain score: 2  Airway patency: patent  Nausea & Vomiting: no nausea and no vomiting  Cardiovascular status: blood pressure returned to baseline and hemodynamically stable  Respiratory status: acceptable  Hydration status: euvolemic  Comments: Patient states she does not have any concerns and she is feeling well. Denies residual numbness and tingling in legs. Denies abnormal back pain. Denies headache. Denies issues using the bathroom.  Multimodal analgesia pain management approach  Pain management: adequate    No notable events documented.

## 2025-06-11 NOTE — LACTATION NOTE
Lactation note:    [x] Feeding observed, see lactation flowsheet.    [x] Patient states breastfeeding is going well:  no complaints.  Denies questions or concerns.    [x] Patient has questions/concerns.  Assisted with insurance pump and flange sizing. Size 13mm flange inserts given.    [x] Verbalizes understanding, advised to follow up with lactation consultant if necessary.

## 2025-06-12 VITALS
HEART RATE: 61 BPM | DIASTOLIC BLOOD PRESSURE: 68 MMHG | RESPIRATION RATE: 16 BRPM | OXYGEN SATURATION: 97 % | TEMPERATURE: 98.3 F | SYSTOLIC BLOOD PRESSURE: 114 MMHG

## 2025-06-12 PROCEDURE — 6370000000 HC RX 637 (ALT 250 FOR IP): Performed by: ADVANCED PRACTICE MIDWIFE

## 2025-06-12 RX ORDER — ACETAMINOPHEN 500 MG
1000 TABLET ORAL EVERY 6 HOURS PRN
Qty: 120 TABLET | Refills: 3 | Status: SHIPPED | OUTPATIENT
Start: 2025-06-12

## 2025-06-12 RX ORDER — PSEUDOEPHEDRINE HCL 30 MG
100 TABLET ORAL 2 TIMES DAILY PRN
Qty: 30 CAPSULE | Refills: 1 | Status: SHIPPED | OUTPATIENT
Start: 2025-06-12

## 2025-06-12 RX ORDER — IBUPROFEN 800 MG/1
800 TABLET, FILM COATED ORAL EVERY 8 HOURS PRN
Qty: 120 TABLET | Refills: 3 | Status: SHIPPED | OUTPATIENT
Start: 2025-06-12

## 2025-06-12 RX ADMIN — WITCH HAZEL: 500 SOLUTION RECTAL; TOPICAL at 14:27

## 2025-06-12 RX ADMIN — IBUPROFEN 800 MG: 800 TABLET, FILM COATED ORAL at 05:50

## 2025-06-12 RX ADMIN — ACETAMINOPHEN 1000 MG: 500 TABLET ORAL at 01:10

## 2025-06-12 ASSESSMENT — PAIN DESCRIPTION - LOCATION
LOCATION: HEAD
LOCATION: HEAD

## 2025-06-12 ASSESSMENT — PAIN SCALES - GENERAL
PAINLEVEL_OUTOF10: 3
PAINLEVEL_OUTOF10: 4

## 2025-06-12 NOTE — FLOWSHEET NOTE
Patient off unit in stable condition.    Departure Mode: with significant other.    Mobility at Departure: wheelchair    Discharged to: private residence    Time of Discharge: 144

## 2025-06-12 NOTE — PROGRESS NOTES
Subjective:       24 y.o.   @ 39w5d    Postpartum Day 2: Vaginal Delivery on 6/10/2025    The patient feels well. The patient denies emotional concerns. Pain is well controlled with current medications.The patient is ambulating well. The patient is tolerating a normal diet.    Objective:      Patient Vitals for the past 8 hrs:   BP Temp Temp src Pulse Resp SpO2   25 0736 114/68 98.3 °F (36.8 °C) Oral 61 16 97 %     General:    alert, appears stated age, and cooperative   Bowel Sounds:  active   Lochia:  appropriate   Uterine Fundus:   Firm @ U-2   Perineum:  Intact   DVT Evaluation:  No evidence of DVT seen on physical exam.         Assessment:     Status post Vaginal Delivery.       Plan:     1.Routine postpartum care.  2. Discharge Planning initiated      Joselo Hassan DO, MBA, FACOOG  2025

## 2025-06-12 NOTE — PLAN OF CARE
Problem: Pain  Goal: Verbalizes/displays adequate comfort level or baseline comfort level  6/10/2025 1453 by Katlyn Mcgovern RN  Outcome: Progressing  6/10/2025 0303 by Caroline Ortiz RN  Outcome: Progressing     Problem: Infection - Adult  Goal: Absence of infection at discharge  6/10/2025 1453 by Katlyn Mcgovern RN  Outcome: Progressing  6/10/2025 0303 by Caroline Ortiz RN  Outcome: Progressing  Goal: Absence of infection during hospitalization  6/10/2025 1453 by Katlyn Mcgovern RN  Outcome: Progressing  6/10/2025 0303 by Caroline Ortiz RN  Outcome: Progressing  Goal: Absence of fever/infection during anticipated neutropenic period  6/10/2025 1453 by Katlyn Mcgovern RN  Outcome: Progressing  6/10/2025 0303 by Caroline Ortiz RN  Outcome: Progressing     Problem: Safety - Adult  Goal: Free from fall injury  6/10/2025 1453 by Katlyn Mcgovern RN  Outcome: Progressing  6/10/2025 0303 by Caroline Ortiz RN  Outcome: Progressing     Problem: Discharge Planning  Goal: Discharge to home or other facility with appropriate resources  6/10/2025 1453 by Katlyn Mcgovern RN  Outcome: Progressing  6/10/2025 0303 by Caroline Ortiz RN  Outcome: Progressing     Problem: Chronic Conditions and Co-morbidities  Goal: Patient's chronic conditions and co-morbidity symptoms are monitored and maintained or improved  6/10/2025 1453 by Katlyn Mcgovern RN  Outcome: Progressing  6/10/2025 0303 by Caroline Ortiz RN  Outcome: Progressing     
  Problem: Pain  Goal: Verbalizes/displays adequate comfort level or baseline comfort level  6/11/2025 0751 by Swapna Bahena RN  Outcome: Progressing  6/10/2025 2306 by Caroline Ortiz RN  Outcome: Progressing  Flowsheets (Taken 6/10/2025 1956)  Verbalizes/displays adequate comfort level or baseline comfort level:   Encourage patient to monitor pain and request assistance   Assess pain using appropriate pain scale   Administer analgesics based on type and severity of pain and evaluate response   Implement non-pharmacological measures as appropriate and evaluate response   Consider cultural and social influences on pain and pain management   Notify Licensed Independent Practitioner if interventions unsuccessful or patient reports new pain     Problem: Infection - Adult  Goal: Absence of infection at discharge  6/11/2025 0751 by Swapna Bahena RN  Outcome: Progressing  6/10/2025 2306 by Caroline Ortiz RN  Outcome: Progressing  Goal: Absence of infection during hospitalization  6/11/2025 0751 by Swapna Bahena RN  Outcome: Progressing  6/10/2025 2306 by Caroline Ortiz RN  Outcome: Progressing  Goal: Absence of fever/infection during anticipated neutropenic period  6/11/2025 0751 by Swapna Bahena RN  Outcome: Progressing  6/10/2025 2306 by Caroline Ortiz RN  Outcome: Progressing     Problem: Safety - Adult  Goal: Free from fall injury  6/11/2025 0751 by Swapna Bahena RN  Outcome: Progressing  6/10/2025 2306 by Caroline Ortiz RN  Outcome: Progressing     Problem: Discharge Planning  Goal: Discharge to home or other facility with appropriate resources  6/11/2025 0751 by Swapna Bahena RN  Outcome: Progressing  6/10/2025 2306 by Caroline Ortiz RN  Outcome: Progressing     Problem: Chronic Conditions and Co-morbidities  Goal: Patient's chronic conditions and co-morbidity symptoms are monitored and maintained or improved  6/11/2025 0751 by Swapna Bahena RN  Outcome: Progressing  6/10/2025 
  Problem: Pain  Goal: Verbalizes/displays adequate comfort level or baseline comfort level  6/12/2025 0909 by Dyana Hernandez  Outcome: Adequate for Discharge  6/11/2025 2309 by Julisa Telles RN  Outcome: Progressing  6/11/2025 2309 by Julisa Telles RN  Outcome: Progressing     Problem: Infection - Adult  Goal: Absence of infection at discharge  6/12/2025 0909 by Dyana Hernandez  Outcome: Adequate for Discharge  6/11/2025 2309 by Julisa Telles RN  Outcome: Progressing  6/11/2025 2309 by Julisa Telles RN  Outcome: Progressing  Goal: Absence of infection during hospitalization  6/12/2025 0909 by Dyana Hernandez  Outcome: Adequate for Discharge  6/11/2025 2309 by Julisa Telles RN  Outcome: Progressing  6/11/2025 2309 by Julisa Telles RN  Outcome: Progressing  Goal: Absence of fever/infection during anticipated neutropenic period  6/12/2025 0909 by Dyana Hernandez  Outcome: Adequate for Discharge  6/11/2025 2309 by Julisa Telles RN  Outcome: Progressing  6/11/2025 2309 by Julisa Telles RN  Outcome: Progressing     Problem: Safety - Adult  Goal: Free from fall injury  6/12/2025 0909 by Dyana Hernandez  Outcome: Adequate for Discharge  6/11/2025 2309 by Julisa Telles RN  Outcome: Progressing  6/11/2025 2309 by Julisa Telles RN  Outcome: Progressing     Problem: Discharge Planning  Goal: Discharge to home or other facility with appropriate resources  6/12/2025 0909 by Dyana Hernandez  Outcome: Adequate for Discharge  6/11/2025 2309 by Julisa Telles RN  Outcome: Progressing  6/11/2025 2309 by Julisa Telles RN  Outcome: Progressing     Problem: Chronic Conditions and Co-morbidities  Goal: Patient's chronic conditions and co-morbidity symptoms are monitored and maintained or improved  6/12/2025 0909 by Dyana Hernandez  Outcome: Adequate for Discharge  6/11/2025 2309 by Julisa Telles RN  Outcome: Progressing  6/11/2025 2309 by Julisa Telles RN  Outcome: 
  Problem: Pain  Goal: Verbalizes/displays adequate comfort level or baseline comfort level  Outcome: Progressing     Problem: Infection - Adult  Goal: Absence of infection at discharge  Outcome: Progressing  Goal: Absence of infection during hospitalization  Outcome: Progressing  Goal: Absence of fever/infection during anticipated neutropenic period  Outcome: Progressing     Problem: Safety - Adult  Goal: Free from fall injury  Outcome: Progressing     Problem: Discharge Planning  Goal: Discharge to home or other facility with appropriate resources  Outcome: Progressing     Problem: Chronic Conditions and Co-morbidities  Goal: Patient's chronic conditions and co-morbidity symptoms are monitored and maintained or improved  Outcome: Progressing     
  Problem: Vaginal Birth or  Section  Goal: Fetal and maternal status remain reassuring during the birth process  Description:  Birth OB-Pregnancy care plan goal which identifies if the fetal and maternal status remain reassuring during the birth process  Outcome: Progressing     Problem: Pain  Goal: Verbalizes/displays adequate comfort level or baseline comfort level  Outcome: Progressing     Problem: Infection - Adult  Goal: Absence of infection at discharge  Outcome: Progressing  Goal: Absence of infection during hospitalization  Outcome: Progressing  Goal: Absence of fever/infection during anticipated neutropenic period  Outcome: Progressing     Problem: Safety - Adult  Goal: Free from fall injury  Outcome: Progressing     Problem: Discharge Planning  Goal: Discharge to home or other facility with appropriate resources  Outcome: Progressing     Problem: Chronic Conditions and Co-morbidities  Goal: Patient's chronic conditions and co-morbidity symptoms are monitored and maintained or improved  Outcome: Progressing     
Katlyn Mcgovern, RN  Outcome: Progressing

## 2025-06-12 NOTE — DISCHARGE SUMMARY
Obstetric Discharge Summary    Reasons for Admission on 6/10/2025  1:43 AM  Onset of Labor    Prenatal Procedures  None    Intrapartum Procedures  Vaginal Delivery    Postpartum Procedures  None    [place] Data  Information for the patient's :  Leon Brown [[id number]]   male   Birth Weight: 3.305 kg (7 lb 4.6 oz)  Discharge With Mother  Complications: No    Discharge Diagnosis  Patient Active Problem List    Diagnosis Date Noted    Migraine without aura and without status migrainosus, not intractable 05/10/2022    Uterine contractions 06/10/2025    Normal delivery 06/10/2025       Discharge Information  Current Discharge Medication List        START taking these medications    Details   acetaminophen (TYLENOL) 500 MG tablet Take 2 tablets by mouth every 6 hours as needed for Pain  Qty: 120 tablet, Refills: 3      ibuprofen (ADVIL;MOTRIN) 800 MG tablet Take 1 tablet by mouth every 8 hours as needed for Pain  Qty: 120 tablet, Refills: 3      docusate sodium (COLACE, DULCOLAX) 100 MG CAPS Take 100 mg by mouth 2 times daily as needed for Constipation  Qty: 30 capsule, Refills: 1           CONTINUE these medications which have NOT CHANGED    Details   ondansetron (ZOFRAN) 4 MG tablet Take 1 tablet by mouth every 8 hours as needed for Nausea or Vomiting  Qty: 30 tablet, Refills: 2      Prenatal Vit-Fe Fumarate-FA (PRENATAL 19 PO) Take by mouth      folic acid (FOLVITE) 800 MCG tablet Take 1 tablet by mouth daily               Discharge to: Home    Condition on discharge: Stable    Discharge Date: 2025      Joselo Hassan DO, MBA, FACOOG, FACOG  2025 11:40 AM

## 2025-06-12 NOTE — LACTATION NOTE
Lactation note:    [] Feeding observed, see lactation flowsheet.    [x] Patient states breastfeeding is going well:  no complaints.  Denies questions or concerns.    [] Patient has questions/concerns.       [x] Verbalizes understanding, advised to follow up with lactation consultant if necessary.

## 2025-06-12 NOTE — DISCHARGE INSTRUCTIONS
Follow up with your OB provider as specified.     [facility] OB Department Phone: [phone]    Dr. CHARLOTTE Cantu, DO  Veronica Angeles, JESSICA Quiroz, JESSICA Lopez, JESSICA  [address]  [address]  [place], [place] [address]  [place] Office: [phone]  [place] Office: [phone]    Dr. CHARLOTTE Cantu, DO Tanya Gregg, JESSICA Lopez, JESSICA Potter, CNP  [address], [place] [address] [phone]    Dr. CHARLOTTE Cantu, DO  Nilam Lowe, JESSICA  [address] [facility] [address]  [place], [place] [address] [phone]        Physical Changes   “Afterbirth” pains are cramps that occur in your lower abdomen; these occur due to the uterus shrinking back to its pre-pregnancy size.  Will likely be stronger during breastfeeding.  Usually starts to go away after the 1st week postpartum.  Empty your bladder often and frequently to reduce abdominal cramps.  This allows room for your uterus to shrink back to normal.  If the bladder remains full it can cause increased bleeding.  Kegel Exercises will help restore bladder control.  The 1st bowel movement may take 2-3 days.  To avoid constipation, add a mild stool softener.  To assist with bowel movement, try putting your feet on a stool, rest your elbows on your knees, and take deep breaths.  Hemorrhoids may develop.  If they do, avoid straining, use pre-moistened wipes, and apply ice packs and/or witch hazel pads.   Eat a well-balanced diet focusing on foods high in fiber and protein.  Drink 6-8 glasses of water a day.  Swelling is common but should subside in time.   Keep your legs elevated when possible.  Wear stockings or socks; make sure they are not too tight.  Hair loss may occur.  Your hair goes through a resting phase.  Therefore, you lose less during pregnancy.  Losing hair in large amounts is not unusual for the first 5 months after birth.    Perineal Care/Bleeding  Use the mateusz-bottle after toileting until the

## 2025-06-27 ENCOUNTER — TELEPHONE (OUTPATIENT)
Dept: OBGYN | Age: 25
End: 2025-06-27

## 2025-06-30 ENCOUNTER — TELEPHONE (OUTPATIENT)
Dept: OBGYN | Age: 25
End: 2025-06-30

## 2025-07-08 ENCOUNTER — TELEPHONE (OUTPATIENT)
Dept: OBGYN | Age: 25
End: 2025-07-08

## 2025-07-21 ENCOUNTER — POSTPARTUM VISIT (OUTPATIENT)
Dept: OBGYN | Age: 25
End: 2025-07-21
Payer: COMMERCIAL

## 2025-07-21 VITALS
DIASTOLIC BLOOD PRESSURE: 66 MMHG | HEIGHT: 64 IN | WEIGHT: 130.4 LBS | BODY MASS INDEX: 22.26 KG/M2 | SYSTOLIC BLOOD PRESSURE: 116 MMHG

## 2025-07-21 DIAGNOSIS — N63.21 BREAST LUMP ON LEFT SIDE AT 2 O'CLOCK POSITION: ICD-10-CM

## 2025-07-21 LAB — HGB, POC: 11.9 G/DL

## 2025-07-21 PROCEDURE — 85018 HEMOGLOBIN: CPT | Performed by: ADVANCED PRACTICE MIDWIFE

## 2025-07-21 PROCEDURE — 0503F POSTPARTUM CARE VISIT: CPT | Performed by: ADVANCED PRACTICE MIDWIFE

## 2025-07-21 NOTE — PROGRESS NOTES
POSTPARTUM EXAM    Date of service: 2025    Fred Brown  Is a 24 y.o.  female    PT's PCP is: Akosua Gandara APRN - NP     : 2000     OB History    Para Term  AB Living   1 1 1   1   SAB IAB Ectopic Molar Multiple Live Births       0 1      # Outcome Date GA Lbr Jerod/2nd Weight Sex Type Anes PTL Lv   1 Term 06/10/25 39w5d 08:22 / 02:11 3.305 kg M Vag-Spont EPI N MARIANNE        Social History     Tobacco Use   Smoking Status Never   Smokeless Tobacco Never         Social History     Substance and Sexual Activity   Alcohol Use Not Currently         Delivery date 06/10/2025    Type of delivery:  Spontaneous vaginal delivery    Laceration:Yes,     Infant gender: male    Infant name: Alvarez    Are you breast or bottle feeding?  Breast    Have you been sexually active since delivery? No    Vital Signs: Blood pressure 116/66, height 1.626 m (5' 4\"), weight 59.1 kg (130 lb 6.4 oz), last menstrual period 2024, currently breastfeeding.     Labs:    Blood Type and Rh: A POSITIVE          Allergies: Patient has no known allergies.      Current Outpatient Medications:     Prenatal Vit-Fe Fumarate-FA (PRENATAL 19 PO), Take by mouth, Disp: , Rfl:     acetaminophen (TYLENOL) 500 MG tablet, Take 2 tablets by mouth every 6 hours as needed for Pain (Patient not taking: Reported on 2025), Disp: 120 tablet, Rfl: 3    ibuprofen (ADVIL;MOTRIN) 800 MG tablet, Take 1 tablet by mouth every 8 hours as needed for Pain (Patient not taking: Reported on 2025), Disp: 120 tablet, Rfl: 3    docusate sodium (COLACE, DULCOLAX) 100 MG CAPS, Take 100 mg by mouth 2 times daily as needed for Constipation (Patient not taking: Reported on 2025), Disp: 30 capsule, Rfl: 1    ondansetron (ZOFRAN) 4 MG tablet, Take 1 tablet by mouth every 8 hours as needed for Nausea or Vomiting (Patient not taking: Reported on 2025), Disp: 30 tablet, Rfl: 2    folic acid (FOLVITE) 800 MCG tablet, Take 1 tablet by mouth